# Patient Record
Sex: FEMALE | Race: BLACK OR AFRICAN AMERICAN | NOT HISPANIC OR LATINO | Employment: FULL TIME | URBAN - METROPOLITAN AREA
[De-identification: names, ages, dates, MRNs, and addresses within clinical notes are randomized per-mention and may not be internally consistent; named-entity substitution may affect disease eponyms.]

---

## 2023-03-14 ENCOUNTER — LAB VISIT (OUTPATIENT)
Dept: LAB | Facility: HOSPITAL | Age: 33
End: 2023-03-14
Attending: NURSE PRACTITIONER
Payer: MEDICAID

## 2023-03-14 DIAGNOSIS — Z01.818 PRE-OP EVALUATION: Primary | ICD-10-CM

## 2023-03-14 PROCEDURE — 93010 ELECTROCARDIOGRAM REPORT: CPT | Mod: ,,, | Performed by: INTERNAL MEDICINE

## 2023-03-14 PROCEDURE — 93010 EKG 12-LEAD: ICD-10-PCS | Mod: ,,, | Performed by: INTERNAL MEDICINE

## 2023-03-14 PROCEDURE — 93005 ELECTROCARDIOGRAM TRACING: CPT

## 2023-12-23 ENCOUNTER — HOSPITAL ENCOUNTER (EMERGENCY)
Facility: HOSPITAL | Age: 33
Discharge: HOME | End: 2023-12-23
Attending: INTERNAL MEDICINE
Payer: MEDICAID

## 2023-12-23 VITALS
OXYGEN SATURATION: 98 % | RESPIRATION RATE: 20 BRPM | WEIGHT: 208 LBS | HEART RATE: 87 BPM | BODY MASS INDEX: 35.51 KG/M2 | HEIGHT: 64 IN | SYSTOLIC BLOOD PRESSURE: 110 MMHG | TEMPERATURE: 98 F | DIASTOLIC BLOOD PRESSURE: 72 MMHG

## 2023-12-23 DIAGNOSIS — J01.90 ACUTE SINUSITIS, RECURRENCE NOT SPECIFIED, UNSPECIFIED LOCATION: Primary | ICD-10-CM

## 2023-12-23 DIAGNOSIS — R06.02 SOB (SHORTNESS OF BREATH): ICD-10-CM

## 2023-12-23 LAB
FLUAV AG UPPER RESP QL IA.RAPID: NOT DETECTED
FLUBV AG UPPER RESP QL IA.RAPID: NOT DETECTED
SARS-COV-2 RNA RESP QL NAA+PROBE: NOT DETECTED

## 2023-12-23 PROCEDURE — 0240U COVID/FLU A&B PCR: CPT | Performed by: INTERNAL MEDICINE

## 2023-12-23 PROCEDURE — 93010 EKG 12-LEAD: ICD-10-PCS | Mod: ,,, | Performed by: INTERNAL MEDICINE

## 2023-12-23 PROCEDURE — 99284 EMERGENCY DEPT VISIT MOD MDM: CPT

## 2023-12-23 PROCEDURE — 93010 ELECTROCARDIOGRAM REPORT: CPT | Mod: ,,, | Performed by: INTERNAL MEDICINE

## 2023-12-23 PROCEDURE — 93005 ELECTROCARDIOGRAM TRACING: CPT

## 2023-12-23 RX ORDER — AMOXICILLIN AND CLAVULANATE POTASSIUM 875; 125 MG/1; MG/1
1 TABLET, FILM COATED ORAL 2 TIMES DAILY
Qty: 20 TABLET | Refills: 0 | Status: SHIPPED | OUTPATIENT
Start: 2023-12-23 | End: 2024-01-02

## 2023-12-23 RX ORDER — AMOXICILLIN AND CLAVULANATE POTASSIUM 875; 125 MG/1; MG/1
1 TABLET, FILM COATED ORAL 2 TIMES DAILY
Qty: 20 TABLET | Refills: 0 | Status: SHIPPED | OUTPATIENT
Start: 2023-12-23 | End: 2023-12-23

## 2023-12-23 RX ORDER — AMOXICILLIN AND CLAVULANATE POTASSIUM 875; 125 MG/1; MG/1
1 TABLET, FILM COATED ORAL 2 TIMES DAILY
Qty: 20 TABLET | Refills: 0 | OUTPATIENT
Start: 2023-12-23 | End: 2023-12-23

## 2023-12-23 RX ORDER — FLUTICASONE PROPIONATE 50 MCG
1 SPRAY, SUSPENSION (ML) NASAL 2 TIMES DAILY PRN
Qty: 15 G | Refills: 0 | Status: SHIPPED | OUTPATIENT
Start: 2023-12-23 | End: 2023-12-23

## 2023-12-23 RX ORDER — CODEINE PHOSPHATE AND GUAIFENESIN 10; 100 MG/5ML; MG/5ML
SOLUTION ORAL
Qty: 120 ML | Refills: 0 | Status: SHIPPED | OUTPATIENT
Start: 2023-12-23 | End: 2023-12-23

## 2023-12-23 RX ORDER — PROMETHAZINE HYDROCHLORIDE AND DEXTROMETHORPHAN HYDROBROMIDE 6.25; 15 MG/5ML; MG/5ML
5 SYRUP ORAL EVERY 6 HOURS PRN
Qty: 100 ML | Refills: 0 | Status: SHIPPED | OUTPATIENT
Start: 2023-12-23 | End: 2023-12-28

## 2023-12-23 RX ORDER — FLUTICASONE PROPIONATE 50 MCG
1 SPRAY, SUSPENSION (ML) NASAL 2 TIMES DAILY PRN
Qty: 15 G | Refills: 0 | Status: SHIPPED | OUTPATIENT
Start: 2023-12-23

## 2023-12-23 NOTE — ED PROVIDER NOTES
Encounter Date: 12/23/2023       History     Chief Complaint   Patient presents with    Shortness of Breath     Reports of SOB, worse with exertion, starting 2 days ago. Patient also has runny nose/congestion. Denies hx of asthma or cardiac hx.      Patient states sinus congestion, runny nose, post-nasal drip, coughing, and body aches x 1 week. Denies any fever. Hx. Of DM, Hyperlipidemia.     The history is provided by the patient.   Sinusitis   This is a recurrent problem. The current episode started several days ago. The problem has been unchanged. The pain has been Intermittent since onset. Associated symptoms include congestion, hoarse voice, sinus pressure and cough. Pertinent negatives include no chills, no sore throat and no swollen glands.     Review of patient's allergies indicates:  No Known Allergies  No past medical history on file.  No past surgical history on file.  No family history on file.     Review of Systems   Constitutional: Negative.  Negative for chills and fever.   HENT:  Positive for congestion, hoarse voice, postnasal drip, rhinorrhea, sinus pressure and sinus pain. Negative for sore throat.    Eyes: Negative.    Respiratory:  Positive for cough.    Cardiovascular: Negative.    Gastrointestinal: Negative.    Endocrine: Negative.    Genitourinary: Negative.    Musculoskeletal: Negative.    Skin: Negative.    Allergic/Immunologic: Negative.    Neurological: Negative.    Hematological: Negative.    Psychiatric/Behavioral: Negative.     All other systems reviewed and are negative.      Physical Exam     Initial Vitals   BP Pulse Resp Temp SpO2   12/23/23 1204 12/23/23 1204 12/23/23 1204 12/23/23 1207 12/23/23 1204   110/72 87 20 98.4 °F (36.9 °C) 98 %      MAP       --                Physical Exam    Nursing note and vitals reviewed.  Constitutional: She appears well-developed and well-nourished. No distress.   HENT:   Head: Normocephalic and atraumatic.   Nose: Right sinus exhibits frontal  sinus tenderness. Left sinus exhibits frontal sinus tenderness.   Mouth/Throat: Uvula is midline, oropharynx is clear and moist and mucous membranes are normal.   Eyes: Conjunctivae and EOM are normal. Pupils are equal, round, and reactive to light.   Neck: Neck supple.   Normal range of motion.  Cardiovascular:  Normal rate, regular rhythm, normal heart sounds and intact distal pulses.           Pulmonary/Chest: Breath sounds normal. No respiratory distress. She has no wheezes.   Abdominal: Abdomen is soft. Bowel sounds are normal. She exhibits no distension. There is no abdominal tenderness.   Musculoskeletal:         General: No tenderness or edema. Normal range of motion.      Cervical back: Normal range of motion and neck supple.     Lymphadenopathy:     She has no cervical adenopathy.   Neurological: She is alert and oriented to person, place, and time. She has normal strength. GCS score is 15. GCS eye subscore is 4. GCS verbal subscore is 5. GCS motor subscore is 6.   Skin: Skin is warm and dry. No rash noted.   Psychiatric: She has a normal mood and affect. Thought content normal.         ED Course   Procedures  Labs Reviewed   COVID/FLU A&B PCR - Normal    Narrative:     The Xpert Xpress SARS-CoV-2/FLU/RSV plus is a rapid, multiplexed real-time PCR test intended for the simultaneous qualitative detection and differentiation of SARS-CoV-2, Influenza A, Influenza B, and respiratory syncytial virus (RSV) viral RNA in either nasopharyngeal swab or nasal swab specimens.           EKG Readings: (Independently Interpreted)   Initial Reading: No STEMI. Rhythm: Normal Sinus Rhythm. Heart Rate: 91. Ectopy: No Ectopy. Conduction: Normal. ST Segments: Normal ST Segments. T Waves: Normal. Axis: Normal. Clinical Impression: Normal Sinus Rhythm     ECG Results              EKG 12-lead (Preliminary result)  Result time 12/23/23 12:23:55      Wet Read by Erik Arteaga DO (12/23/23 12:23:55, [facility]  Orthopaedics - Emergency Dept, Emergency Medicine)    Independent ECG Interpretation:    NSR at rate of 91. Normal intervals. Normal QRS. Nonspecific ST or T wave abnormalities. Overall impression: Abnormal                                     Imaging Results    None          Medications - No data to display  Medical Decision Making  Patient states sinus congestion, runny nose, post-nasal drip, coughing, and body aches x 1 week. Denies any fever. Hx. Of DM, Hyperlipidemia.     The history is provided by the patient.   Sinusitis   This is a recurrent problem. The current episode started several days ago. The problem has been unchanged. The pain has been Intermittent since onset. Associated symptoms include congestion, hoarse voice, sinus pressure and cough. Pertinent negatives include no chills, no sore throat and no swollen glands.   Patient is awake, alert, afebrile, and nontoxic appearing in the ED.     Amount and/or Complexity of Data Reviewed  Labs: ordered. Decision-making details documented in ED Course.  Discussion of management or test interpretation with external provider(s): Differential diagnosis (including but not limited to):   Judging by the patient's chief complaint and pertinent history, the patient has the following possible differential diagnoses, including but not limited to the following.  Some of these are deemed to be lower likelihood and some more likely based on my physical exam and history combined with possible lab work and/or imaging studies.   Please see the pertinent studies, and refer to the HPI.  Some of these diagnoses will take further evaluation to fully rule out, perhaps as an outpatient and the patient was encouraged to follow up when discharged for more comprehensive evaluation.  Patient is negative for flu and Covid. Patient states that she is not having any SOB. States only congestion, post-nasal drip, and coughing. States that she was on antibiotics x 3 weeks ago for a  different complaint. Will treat for Sinusitis. Will prescribe Augmentin due to recent antibiotic use and co-morbidities. ED return precautions were given.       Risk  Prescription drug management.               ED Course as of 12/23/23 1333   Sat Dec 23, 2023   1317 COVID/FLU A&B PCR [AB]      ED Course User Index  [AB] Manju Kohli FNP                           Clinical Impression:  Final diagnoses:  [R06.02] SOB (shortness of breath)  [J01.90] Acute sinusitis, recurrence not specified, unspecified location (Primary)          ED Disposition Condition    Discharge Stable          ED Prescriptions       Medication Sig Dispense Start Date End Date Auth. Provider    amoxicillin-clavulanate 875-125mg (AUGMENTIN) 875-125 mg per tablet  (Status: Discontinued) Take 1 tablet by mouth 2 (two) times daily. for 10 days 20 tablet 12/23/2023 12/23/2023 Manju Kohli FNP    guaiFENesin-codeine 100-10 mg/5 ml (TUSSI-ORGANIDIN NR)  mg/5 mL syrup  (Status: Discontinued) May take 5 mL by mouth as needed for coughing every 6 hours. 120 mL 12/23/2023 12/23/2023 Manju Kohli FNP    fluticasone propionate (FLONASE) 50 mcg/actuation nasal spray  (Status: Discontinued) 1 spray (50 mcg total) by Each Nostril route 2 (two) times daily as needed for Rhinitis. 15 g 12/23/2023 12/23/2023 Manju Kohli FNP    amoxicillin-clavulanate 875-125mg (AUGMENTIN) 875-125 mg per tablet  (Status: Discontinued) Take 1 tablet by mouth 2 (two) times daily. for 10 days 20 tablet 12/23/2023 12/23/2023 Manju Kohli FNP    fluticasone propionate (FLONASE) 50 mcg/actuation nasal spray  (Status: Discontinued) 1 spray (50 mcg total) by Each Nostril route 2 (two) times daily as needed for Rhinitis. 15 g 12/23/2023 12/23/2023 Manju Kohli FNP    guaiFENesin-codeine 100-10 mg/5 ml (TUSSI-ORGANIDIN NR)  mg/5 mL syrup  (Status: Discontinued) May take 5 mL by mouth as needed for coughing every 6 hours. 120 mL 12/23/2023 12/23/2023 Bearb,  KARTIK Nicholas    amoxicillin-clavulanate 875-125mg (AUGMENTIN) 875-125 mg per tablet  (Status: Discontinued) Take 1 tablet by mouth 2 (two) times daily. for 10 days 20 tablet 12/23/2023 12/23/2023 Manju oKhli FNP    fluticasone propionate (FLONASE) 50 mcg/actuation nasal spray 1 spray (50 mcg total) by Each Nostril route 2 (two) times daily as needed for Rhinitis. 15 g 12/23/2023 -- Manju Kohli FNP    amoxicillin-clavulanate 875-125mg (AUGMENTIN) 875-125 mg per tablet Take 1 tablet by mouth 2 (two) times daily. for 10 days 20 tablet 12/23/2023 1/2/2024 Manju Kohli FNP    promethazine-dextromethorphan (PROMETHAZINE-DM) 6.25-15 mg/5 mL Syrp Take 5 mLs by mouth every 6 (six) hours as needed (Cough). 100 mL 12/23/2023 12/28/2023 Manju Kohli FNP          Follow-up Information       Follow up With Specialties Details Why Contact Info    Georgina Calix FNP Family Medicine In 3 days  [address] [facility] [id number] [phone]               Manju Kohli FNP  12/23/23 8332

## 2024-05-28 ENCOUNTER — HOSPITAL ENCOUNTER (EMERGENCY)
Facility: HOSPITAL | Age: 34
Discharge: HOME | End: 2024-05-28
Attending: INTERNAL MEDICINE
Payer: MEDICAID

## 2024-05-28 VITALS
RESPIRATION RATE: 15 BRPM | HEIGHT: 64 IN | SYSTOLIC BLOOD PRESSURE: 118 MMHG | WEIGHT: 193 LBS | BODY MASS INDEX: 32.95 KG/M2 | OXYGEN SATURATION: 100 % | TEMPERATURE: 98 F | DIASTOLIC BLOOD PRESSURE: 78 MMHG | HEART RATE: 90 BPM

## 2024-05-28 DIAGNOSIS — N30.00 ACUTE CYSTITIS WITHOUT HEMATURIA: Primary | ICD-10-CM

## 2024-05-28 DIAGNOSIS — R10.9 RIGHT FLANK PAIN: ICD-10-CM

## 2024-05-28 LAB
ALBUMIN SERPL-MCNC: 3.4 G/DL (ref 3.5–5)
ALBUMIN/GLOB SERPL: 0.9 RATIO (ref 1.1–2)
ALP SERPL-CCNC: 62 UNIT/L (ref 40–150)
ALT SERPL-CCNC: 11 UNIT/L (ref 0–55)
ANION GAP SERPL CALC-SCNC: 8 MEQ/L
AST SERPL-CCNC: 11 UNIT/L (ref 5–34)
B-HCG UR QL: NEGATIVE
BACTERIA #/AREA URNS AUTO: ABNORMAL /HPF
BASOPHILS # BLD AUTO: 0.03 X10(3)/MCL
BASOPHILS NFR BLD AUTO: 0.4 %
BILIRUB SERPL-MCNC: 0.4 MG/DL
BILIRUB UR QL STRIP.AUTO: NEGATIVE
BUN SERPL-MCNC: 8.8 MG/DL (ref 7–18.7)
CALCIUM SERPL-MCNC: 8.9 MG/DL (ref 8.4–10.2)
CHLORIDE SERPL-SCNC: 101 MMOL/L (ref 98–107)
CLARITY UR: ABNORMAL
CO2 SERPL-SCNC: 27 MMOL/L (ref 22–29)
COLOR UR AUTO: ABNORMAL
CREAT SERPL-MCNC: 0.8 MG/DL (ref 0.55–1.02)
CREAT/UREA NIT SERPL: 11
EOSINOPHIL # BLD AUTO: 0.16 X10(3)/MCL (ref 0–0.9)
EOSINOPHIL NFR BLD AUTO: 2.2 %
ERYTHROCYTE [DISTWIDTH] IN BLOOD BY AUTOMATED COUNT: 11.9 % (ref 11.5–17)
GFR SERPLBLD CREATININE-BSD FMLA CKD-EPI: >60 ML/MIN/1.73/M2
GLOBULIN SER-MCNC: 3.8 GM/DL (ref 2.4–3.5)
GLUCOSE SERPL-MCNC: 311 MG/DL (ref 74–100)
GLUCOSE UR QL STRIP: >=1000
HCT VFR BLD AUTO: 39.8 % (ref 37–47)
HGB BLD-MCNC: 13.8 G/DL (ref 12–16)
HGB UR QL STRIP: ABNORMAL
IMM GRANULOCYTES # BLD AUTO: 0.02 X10(3)/MCL (ref 0–0.04)
IMM GRANULOCYTES NFR BLD AUTO: 0.3 %
INR PPP: 1 (ref 2–3)
KETONES UR QL STRIP: NEGATIVE
LEUKOCYTE ESTERASE UR QL STRIP: ABNORMAL
LIPASE SERPL-CCNC: 64 U/L
LYMPHOCYTES # BLD AUTO: 1.51 X10(3)/MCL (ref 0.6–4.6)
LYMPHOCYTES NFR BLD AUTO: 20.9 %
MAGNESIUM SERPL-MCNC: 1.7 MG/DL (ref 1.6–2.6)
MCH RBC QN AUTO: 28.6 PG (ref 27–31)
MCHC RBC AUTO-ENTMCNC: 34.7 G/DL (ref 33–36)
MCV RBC AUTO: 82.4 FL (ref 80–94)
MONOCYTES # BLD AUTO: 0.38 X10(3)/MCL (ref 0.1–1.3)
MONOCYTES NFR BLD AUTO: 5.3 %
NEUTROPHILS # BLD AUTO: 5.12 X10(3)/MCL (ref 2.1–9.2)
NEUTROPHILS NFR BLD AUTO: 70.9 %
NITRITE UR QL STRIP: NEGATIVE
NRBC BLD AUTO-RTO: 0 %
PH UR STRIP: 6 [PH]
PLATELET # BLD AUTO: 215 X10(3)/MCL (ref 130–400)
PMV BLD AUTO: 8.8 FL (ref 7.4–10.4)
POCT GLUCOSE: 301 MG/DL (ref 70–110)
POTASSIUM SERPL-SCNC: 4.2 MMOL/L (ref 3.5–5.1)
PROT SERPL-MCNC: 7.2 GM/DL (ref 6.4–8.3)
PROT UR QL STRIP: ABNORMAL
PROTHROMBIN TIME: 13.1 SECONDS (ref 11.7–14.5)
RBC # BLD AUTO: 4.83 X10(6)/MCL (ref 4.2–5.4)
RBC #/AREA URNS AUTO: ABNORMAL /HPF
SODIUM SERPL-SCNC: 136 MMOL/L (ref 136–145)
SP GR UR STRIP.AUTO: 1.02 (ref 1–1.03)
SQUAMOUS #/AREA URNS AUTO: ABNORMAL /HPF
UROBILINOGEN UR STRIP-ACNC: 0.2
WBC # SPEC AUTO: 7.22 X10(3)/MCL (ref 4.5–11.5)
WBC #/AREA URNS AUTO: ABNORMAL /HPF

## 2024-05-28 PROCEDURE — 83690 ASSAY OF LIPASE: CPT | Performed by: INTERNAL MEDICINE

## 2024-05-28 PROCEDURE — 81003 URINALYSIS AUTO W/O SCOPE: CPT | Performed by: INTERNAL MEDICINE

## 2024-05-28 PROCEDURE — 63600175 PHARM REV CODE 636 W HCPCS: Performed by: INTERNAL MEDICINE

## 2024-05-28 PROCEDURE — 80053 COMPREHEN METABOLIC PANEL: CPT | Performed by: INTERNAL MEDICINE

## 2024-05-28 PROCEDURE — 96375 TX/PRO/DX INJ NEW DRUG ADDON: CPT

## 2024-05-28 PROCEDURE — 87086 URINE CULTURE/COLONY COUNT: CPT | Performed by: INTERNAL MEDICINE

## 2024-05-28 PROCEDURE — 85610 PROTHROMBIN TIME: CPT | Performed by: INTERNAL MEDICINE

## 2024-05-28 PROCEDURE — 83735 ASSAY OF MAGNESIUM: CPT | Performed by: INTERNAL MEDICINE

## 2024-05-28 PROCEDURE — 99285 EMERGENCY DEPT VISIT HI MDM: CPT | Mod: 25

## 2024-05-28 PROCEDURE — 85025 COMPLETE CBC W/AUTO DIFF WBC: CPT | Performed by: INTERNAL MEDICINE

## 2024-05-28 PROCEDURE — 25500020 PHARM REV CODE 255: Performed by: INTERNAL MEDICINE

## 2024-05-28 PROCEDURE — 96374 THER/PROPH/DIAG INJ IV PUSH: CPT | Mod: 59

## 2024-05-28 PROCEDURE — 96361 HYDRATE IV INFUSION ADD-ON: CPT

## 2024-05-28 PROCEDURE — 81025 URINE PREGNANCY TEST: CPT | Performed by: INTERNAL MEDICINE

## 2024-05-28 RX ORDER — CIPROFLOXACIN 500 MG/1
500 TABLET ORAL 2 TIMES DAILY
Qty: 20 TABLET | Refills: 0 | Status: SHIPPED | OUTPATIENT
Start: 2024-05-28 | End: 2024-05-31 | Stop reason: SDUPTHER

## 2024-05-28 RX ORDER — ONDANSETRON 4 MG/1
4 TABLET, ORALLY DISINTEGRATING ORAL EVERY 6 HOURS PRN
Qty: 15 TABLET | Refills: 0 | Status: SHIPPED | OUTPATIENT
Start: 2024-05-28

## 2024-05-28 RX ORDER — KETOROLAC TROMETHAMINE 10 MG/1
10 TABLET, FILM COATED ORAL EVERY 6 HOURS PRN
Qty: 15 TABLET | Refills: 0 | Status: SHIPPED | OUTPATIENT
Start: 2024-05-28

## 2024-05-28 RX ORDER — KETOROLAC TROMETHAMINE 30 MG/ML
30 INJECTION, SOLUTION INTRAMUSCULAR; INTRAVENOUS
Status: COMPLETED | OUTPATIENT
Start: 2024-05-28 | End: 2024-05-28

## 2024-05-28 RX ORDER — HYDROCODONE BITARTRATE AND ACETAMINOPHEN 7.5; 325 MG/1; MG/1
1 TABLET ORAL EVERY 6 HOURS PRN
Qty: 12 TABLET | Refills: 0 | Status: SHIPPED | OUTPATIENT
Start: 2024-05-28

## 2024-05-28 RX ORDER — ONDANSETRON HYDROCHLORIDE 2 MG/ML
4 INJECTION, SOLUTION INTRAVENOUS ONCE
Status: COMPLETED | OUTPATIENT
Start: 2024-05-28 | End: 2024-05-28

## 2024-05-28 RX ADMIN — KETOROLAC TROMETHAMINE 30 MG: 30 INJECTION, SOLUTION INTRAMUSCULAR at 03:05

## 2024-05-28 RX ADMIN — SODIUM CHLORIDE, POTASSIUM CHLORIDE, SODIUM LACTATE AND CALCIUM CHLORIDE 1000 ML: 600; 310; 30; 20 INJECTION, SOLUTION INTRAVENOUS at 11:05

## 2024-05-28 RX ADMIN — IOHEXOL 100 ML: 350 INJECTION, SOLUTION INTRAVENOUS at 12:05

## 2024-05-28 RX ADMIN — ONDANSETRON 4 MG: 2 INJECTION INTRAMUSCULAR; INTRAVENOUS at 11:05

## 2024-05-28 NOTE — ED NOTES
, pt drank OJ pta & did not take her glyburide or metformin today, she did take Ozempic yesterday.

## 2024-05-28 NOTE — ED PROVIDER NOTES
Source of History:  Patient, no limitations    Chief complaint:  Abdominal Pain (Pt c/o right sided abd pain onset today. States has also noticed blood in urine.)      HPI:  Tacos Basilio is a 33 y.o. female presenting with Abdominal Pain (Pt c/o right sided abd pain onset today. States has also noticed blood in urine.)       Right abd pain after breakfast this morning, ate eggs and sausage, no vomiting, no diarrhea, is on Ozympic and last dose yesterday     Patient presents for evaluation of abdominal pain. Onset of symptoms was this moning with unchanged course since that time. The pain is located RUQ, RLQ. The pain is rated as severe. The pain is made worse by pressure and is relieved by nothing. The pertinent past history includes obesity on ozympic.         Review of Systems   Constitutional symptoms:  Negative except as documented in HPI.   Skin symptoms:  Negative except as documented in HPI.   HEENT symptoms:  Negative except as documented in HPI.   Respiratory symptoms:  Negative except as documented in HPI.   Cardiovascular symptoms:  Negative except as documented in HPI.   Gastrointestinal symptoms:  Negative except as documented in HPI.    Genitourinary symptoms:  Negative except as documented in HPI.   Musculoskeletal symptoms:  Negative except as documented in HPI.   Neurologic symptoms:  Negative except as documented in HPI.   Psychiatric symptoms:  Negative except as documented in HPI.   Allergy/immunologic symptoms:  Negative except as documented in HPI.             Additional review of systems information: All other systems reviewed and otherwise negative.      Review of patient's allergies indicates:  No Known Allergies    PMH:  As per HPI and below:    History reviewed. No pertinent past medical history.    History reviewed. No pertinent family history.    History reviewed. No pertinent surgical history.         There is no problem list on file for this patient.       Physical Exam:   "  /78   Pulse 90   Temp 97.9 °F (36.6 °C) (Temporal)   Resp 15   Ht 5' 4" (1.626 m)   Wt 87.5 kg (193 lb)   LMP 05/18/2024 Comment: hx of tubal ligation  SpO2 100%   Breastfeeding No   BMI 33.13 kg/m²     Nursing note and vital signs reviewed.    General:  Alert, no acute distress. Phone in hand texting   Skin: Normal for Ethnic Origin, No cyanosis  HEENT: Normocephalic and atraumatic, Vision unchanged, Pupils symmetric, No icterus , Nasal mucosa is pink and moist  Cardiovascular:  Regular rate and rhythm, No edema  Chest Wall: No deformity, equal chest rise  Respiratory:  Lungs are clear to auscultation, respirations are non-labored.    Musculoskeletal:  No deformity, Normal perfusion to all extremities  Gastrointestinal:  Soft, Non distended, moderate right RUQ tenderness to right CVA tenderness  Neurological:  Alert and oriented, normal motor observed, normal speech observed.    Psychiatric:  Cooperative, appropriate mood & affect.        Labs that have been ordered have been independently reviewed and interpreted by myself.     Old Chart Reviewed.      Initial Impression/ Differential Dx:  GERD, intestinal spasm, gastroenteritis, gastritis, ulcer, cholecystitis, cholelithiasis, gallstones, pancreatitis, ileus, small bowel obstruction, appendicitis, diverticulitis, colitis, constipation, intestinal gas pain.      MDM:      Reviewed Nurses Note.    Reviewed Pertinent old records.    Orders Placed This Encounter    Urine culture    CT Abdomen Pelvis With IV Contrast NO Oral Contrast    Urinalysis, Reflex to Urine Culture    Pregnancy, urine rapid    Urinalysis, Microscopic    CBC Auto Differential    Comprehensive Metabolic Panel    Protime-INR    Lipase    Magnesium    CBC with Differential    Insert peripheral IV    POCT glucose    POCT glucose    lactated ringers bolus 1,000 mL    ondansetron injection 4 mg    iohexoL (OMNIPAQUE 350) injection 100 mL    ketorolac injection 30 mg    ondansetron " (ZOFRAN-ODT) 4 MG TbDL    ketorolac (TORADOL) 10 mg tablet    HYDROcodone-acetaminophen (NORCO) 7.5-325 mg per tablet    ciprofloxacin HCl (CIPRO) 500 MG tablet                    Labs Reviewed   URINALYSIS, REFLEX TO URINE CULTURE - Abnormal; Notable for the following components:       Result Value    Appearance, UA Cloudy (*)     Protein, UA Trace (*)     Glucose, UA >=1000 (*)     Blood, UA Trace (*)     Leukocyte Esterase, UA Trace (*)     All other components within normal limits   URINALYSIS, MICROSCOPIC - Abnormal; Notable for the following components:    Bacteria, UA Moderate (*)     WBC, UA 21-50 (*)     All other components within normal limits   COMPREHENSIVE METABOLIC PANEL - Abnormal; Notable for the following components:    Glucose 311 (*)     Albumin 3.4 (*)     Globulin 3.8 (*)     Albumin/Globulin Ratio 0.9 (*)     All other components within normal limits   PROTIME-INR - Abnormal; Notable for the following components:    INR 1.0 (*)     All other components within normal limits   LIPASE - Abnormal; Notable for the following components:    Lipase Level 64 (*)     All other components within normal limits   POCT GLUCOSE - Abnormal; Notable for the following components:    POCT Glucose 301 (*)     All other components within normal limits   PREGNANCY TEST, URINE RAPID - Normal   MAGNESIUM - Normal   CULTURE, URINE   CBC W/ AUTO DIFFERENTIAL    Narrative:     The following orders were created for panel order CBC Auto Differential.  Procedure                               Abnormality         Status                     ---------                               -----------         ------                     CBC with Differential[[id number]]                           Final result                 Please view results for these tests on the individual orders.   CBC WITH DIFFERENTIAL   POCT GLUCOSE MONITORING CONTINUOUS          CT Abdomen Pelvis With IV Contrast NO Oral Contrast   Final Result      1. Mild  right ureterectasis and urothelial enhancement suggesting inflammation or infection.   There are multiple 2-4 mm calcifications in the pelvis.  Unable to follow the course of the distal ureters to exclude distal ureteral stone.         Electronically signed by: Joanne Da Silva   Date:    05/28/2024   Time:    15:11           Admission on 05/28/2024, Discharged on 05/28/2024   Component Date Value Ref Range Status    Color, UA 05/28/2024 Straw  Yellow, Light-Yellow, Dark Yellow, [place], Straw Final    Appearance, UA 05/28/2024 Cloudy (A)  Clear Final    Specific Gravity, UA 05/28/2024 1.020  1.005 - 1.030 Final    pH, UA 05/28/2024 6.0  5.0 - 8.5 Final    Protein, UA 05/28/2024 Trace (A)  Negative Final    Glucose, UA 05/28/2024 >=1000 (A)  Negative, Normal Final    Ketones, UA 05/28/2024 Negative  Negative Final    Blood, UA 05/28/2024 Trace (A)  Negative Final    Bilirubin, UA 05/28/2024 Negative  Negative Final    Urobilinogen, UA 05/28/2024 0.2  0.2, 1.0, Normal Final    Nitrites, UA 05/28/2024 Negative  Negative Final    Leukocyte Esterase, UA 05/28/2024 Trace (A)  Negative Final    hCG Qualitative, Urine 05/28/2024 Negative  Negative Final    Bacteria, UA 05/28/2024 Moderate (A)  None Seen, Rare, Occasional /HPF Final    RBC, UA 05/28/2024 0-2  None Seen, 0-2, 3-5, 0-5 /HPF Final    WBC, UA 05/28/2024 21-50 (A)  None Seen, 0-2, 3-5, 0-5 /HPF Final    Squamous Epithelial Cells, UA 05/28/2024 Rare  None Seen, Rare, Occasional, Occ /HPF Final    Sodium 05/28/2024 136  136 - 145 mmol/L Final    Potassium 05/28/2024 4.2  3.5 - 5.1 mmol/L Final    Chloride 05/28/2024 101  98 - 107 mmol/L Final    CO2 05/28/2024 27  22 - 29 mmol/L Final    Glucose 05/28/2024 311 (H)  74 - 100 mg/dL Final    Blood Urea Nitrogen 05/28/2024 8.8  7.0 - 18.7 mg/dL Final    Creatinine 05/28/2024 0.80  0.55 - 1.02 mg/dL Final    Calcium 05/28/2024 8.9  8.4 - 10.2 mg/dL Final    Protein Total 05/28/2024 7.2  6.4 - 8.3 gm/dL Final    Albumin  05/28/2024 3.4 (L)  3.5 - 5.0 g/dL Final    Globulin 05/28/2024 3.8 (H)  2.4 - 3.5 gm/dL Final    Albumin/Globulin Ratio 05/28/2024 0.9 (L)  1.1 - 2.0 ratio Final    Bilirubin Total 05/28/2024 0.4  <=1.5 mg/dL Final    ALP 05/28/2024 62  40 - 150 unit/L Final    ALT 05/28/2024 11  0 - 55 unit/L Final    AST 05/28/2024 11  5 - 34 unit/L Final    eGFR 05/28/2024 >60  mL/min/1.73/m2 Final    Anion Gap 05/28/2024 8.0  mEq/L Final    BUN/Creatinine Ratio 05/28/2024 11   Final    PT 05/28/2024 13.1  11.7 - 14.5 seconds Final    INR 05/28/2024 1.0 (L)  2.0 - 3.0 Final    Lipase Level 05/28/2024 64 (H)  <=60 U/L Final    Magnesium Level 05/28/2024 1.70  1.60 - 2.60 mg/dL Final    WBC 05/28/2024 7.22  4.50 - 11.50 x10(3)/mcL Final    RBC 05/28/2024 4.83  4.20 - 5.40 x10(6)/mcL Final    Hgb 05/28/2024 13.8  12.0 - 16.0 g/dL Final    Hct 05/28/2024 39.8  37.0 - 47.0 % Final    MCV 05/28/2024 82.4  80.0 - 94.0 fL Final    MCH 05/28/2024 28.6  27.0 - 31.0 pg Final    MCHC 05/28/2024 34.7  33.0 - 36.0 g/dL Final    RDW 05/28/2024 11.9  11.5 - 17.0 % Final    Platelet 05/28/2024 215  130 - 400 x10(3)/mcL Final    MPV 05/28/2024 8.8  7.4 - 10.4 fL Final    Neut % 05/28/2024 70.9  % Final    Lymph % 05/28/2024 20.9  % Final    Mono % 05/28/2024 5.3  % Final    Eos % 05/28/2024 2.2  % Final    Basophil % 05/28/2024 0.4  % Final    Lymph # 05/28/2024 1.51  0.6 - 4.6 x10(3)/mcL Final    Neut # 05/28/2024 5.12  2.1 - 9.2 x10(3)/mcL Final    Mono # 05/28/2024 0.38  0.1 - 1.3 x10(3)/mcL Final    Eos # 05/28/2024 0.16  0 - 0.9 x10(3)/mcL Final    Baso # 05/28/2024 0.03  <=0.2 x10(3)/mcL Final    IG# 05/28/2024 0.02  0 - 0.04 x10(3)/mcL Final    IG% 05/28/2024 0.3  % Final    NRBC% 05/28/2024 0.0  % Final    POCT Glucose 05/28/2024 301 (H)  70 - 110 mg/dL Final       Imaging Results              CT Abdomen Pelvis With IV Contrast NO Oral Contrast (Final result)  Result time 05/28/24 15:11:05      Final result by Joanne Da Silva MD  (05/28/24 15:11:05)                   Impression:      1. Mild right ureterectasis and urothelial enhancement suggesting inflammation or infection.   There are multiple 2-4 mm calcifications in the pelvis.  Unable to follow the course of the distal ureters to exclude distal ureteral stone.      Electronically signed by: Joanne Da Silva  Date:    05/28/2024  Time:    15:11               Narrative:    EXAMINATION:  CT ABDOMEN PELVIS WITH IV CONTRAST    CLINICAL HISTORY:  Abdominal pain, acute, nonlocalized;    TECHNIQUE:  Helically acquired images with axial, sagittal and coronal reformations were obtained from the lung bases to the pubic symphysis after the IV administration of contrast.    Automated tube current modulation, weight-based exposure dosing, and/or iterative reconstruction technique utilized to reach lowest reasonably achievable exposure rate.    DLP: 434 mGy*cm    COMPARISON:  No relevant prior available for comparison at the time of dictation.    FINDINGS:  HEART: Normal in size. No pericardial effusion.    LUNG BASES: Well aerated.    LIVER: Normal attenuation. No appreciable focal hepatic lesion.    BILIARY: No calcified gallstones.    PANCREAS: No inflammatory change.    SPLEEN: Normal in size    ADRENALS: No mass.    KIDNEYS/URETERS: The kidneys enhance symmetrically.  There is no darlene hydronephrosis.  There is mild right ureterectasis and urothelial enhancement.   There are multiple 2-4 mm calcifications in the pelvis compatible with phleboliths.  However it is difficult to follow the distal right ureter; as such unable to exclude tiny distal ureteral stone.  No perinephric stranding.    GI TRACT/MESENTERY:  No evidence of bowel obstruction or inflammation. The appendix is normal.    PERITONEUM: No free fluid.No free air.    LYMPH NODES: No enlarged lymph nodes by size criteria.    VASCULATURE: No significant atherosclerosis or aneurysm.    BLADDER: Normal appearance given degree of  distention.    REPRODUCTIVE ORGANS: Bartholin's cyst at the left labia.    SOFT TISSUES: Unremarkable.    BONES: No acute osseous abnormality.                                                     ED Course as of 05/28/24 1601   Tue May 28, 2024   1106 Leukocyte Esterase, UA(!): Trace [MP]   1106 Glucose, UA(!): >=1000 [MP]   1217 Glucose(!): 311 [MP]   1217 CO2: 27 [MP]   1217 Lipase(!): 64 [MP]   1519 WBC, UA(!): 21-50 [MP]   1519 RBC, UA: 0-2 [MP]      ED Course User Index  [MP] Erik Arteaga DO                        Diagnostic Impression:    1. Acute cystitis without hematuria    2. Right flank pain         ED Disposition Condition    Discharge Stable             Follow-up Information       [facility] - Emergency Dept.    Specialty: Emergency Medicine  Why: If symptoms worsen  Contact information:  [address]  [facility] [id number] [phone]             Schedule an appointment as soon as possible for a visit  with Ari Hendricks MD.    Specialty: Pediatric Urology  Why: If symptoms worsen  Contact information:  [address]  [address]  [facility]  [facility] [address]  [phone]                              ED Prescriptions       Medication Sig Dispense Start Date End Date Auth. Provider    ondansetron (ZOFRAN-ODT) 4 MG TbDL Take 1 tablet (4 mg total) by mouth every 6 (six) hours as needed (Nausea). 15 tablet 5/28/2024 -- Erik Arteaga DO    ketorolac (TORADOL) 10 mg tablet Take 1 tablet (10 mg total) by mouth every 6 (six) hours as needed for Pain. 15 tablet 5/28/2024 -- Erik Arteaga DO    HYDROcodone-acetaminophen (NORCO) 7.5-325 mg per tablet Take 1 tablet by mouth every 6 (six) hours as needed for Pain. 12 tablet 5/28/2024 -- Erik Arteaga DO    ciprofloxacin HCl (CIPRO) 500 MG tablet Take 1 tablet (500 mg total) by mouth 2 (two) times daily. for 10 days 20 tablet 5/28/2024 6/7/2024 Erik Arteaga  B, DO          Follow-up Information       Follow up With Specialties Details Why Contact Info    [facility] Orth[facility] - Emergency Dept Emergency Medicine  If symptoms worsen [address]  [facility] [id number]  [phone]    Ari Hendricks MD Pediatric Urology Schedule an appointment as soon as possible for a visit  If symptoms worsen [address]  [facility] [address]  [phone]              Erik Arteaga, DO  05/28/24 1603

## 2024-05-30 LAB — BACTERIA UR CULT: ABNORMAL

## 2024-05-31 DIAGNOSIS — N30.00 ACUTE CYSTITIS WITHOUT HEMATURIA: Primary | ICD-10-CM

## 2024-05-31 RX ORDER — SULFAMETHOXAZOLE AND TRIMETHOPRIM 800; 160 MG/1; MG/1
1 TABLET ORAL 2 TIMES DAILY
Qty: 6 TABLET | Refills: 0 | Status: SHIPPED | OUTPATIENT
Start: 2024-05-31 | End: 2024-06-03

## 2024-09-18 ENCOUNTER — HOSPITAL ENCOUNTER (EMERGENCY)
Facility: HOSPITAL | Age: 34
Discharge: HOME | End: 2024-09-18
Attending: STUDENT IN AN ORGANIZED HEALTH CARE EDUCATION/TRAINING PROGRAM
Payer: MEDICAID

## 2024-09-18 VITALS
HEART RATE: 84 BPM | OXYGEN SATURATION: 99 % | HEIGHT: 64 IN | SYSTOLIC BLOOD PRESSURE: 132 MMHG | DIASTOLIC BLOOD PRESSURE: 74 MMHG | RESPIRATION RATE: 18 BRPM | WEIGHT: 185 LBS | BODY MASS INDEX: 31.58 KG/M2 | TEMPERATURE: 99 F

## 2024-09-18 DIAGNOSIS — R10.9 LEFT FLANK PAIN: Primary | ICD-10-CM

## 2024-09-18 DIAGNOSIS — E11.65 UNCONTROLLED TYPE 2 DIABETES MELLITUS WITH HYPERGLYCEMIA: ICD-10-CM

## 2024-09-18 LAB
ALBUMIN SERPL-MCNC: 3.6 G/DL (ref 3.5–5)
ALBUMIN/GLOB SERPL: 1 RATIO (ref 1.1–2)
ALP SERPL-CCNC: 60 UNIT/L (ref 40–150)
ALT SERPL-CCNC: 11 UNIT/L (ref 0–55)
ANION GAP SERPL CALC-SCNC: 9 MEQ/L
AST SERPL-CCNC: 12 UNIT/L (ref 5–34)
B-HCG UR QL: NEGATIVE
BACTERIA #/AREA URNS AUTO: ABNORMAL /HPF
BASOPHILS # BLD AUTO: 0.04 X10(3)/MCL
BASOPHILS NFR BLD AUTO: 0.7 %
BILIRUB SERPL-MCNC: 0.8 MG/DL
BILIRUB UR QL STRIP.AUTO: NEGATIVE
BUN SERPL-MCNC: 7.1 MG/DL (ref 7–18.7)
CALCIUM SERPL-MCNC: 9.3 MG/DL (ref 8.4–10.2)
CHLORIDE SERPL-SCNC: 102 MMOL/L (ref 98–107)
CLARITY UR: ABNORMAL
CO2 SERPL-SCNC: 24 MMOL/L (ref 22–29)
COLOR UR AUTO: YELLOW
CREAT SERPL-MCNC: 0.93 MG/DL (ref 0.55–1.02)
CREAT/UREA NIT SERPL: 8
EOSINOPHIL # BLD AUTO: 0.21 X10(3)/MCL (ref 0–0.9)
EOSINOPHIL NFR BLD AUTO: 3.4 %
ERYTHROCYTE [DISTWIDTH] IN BLOOD BY AUTOMATED COUNT: 11.9 % (ref 11.5–17)
GFR SERPLBLD CREATININE-BSD FMLA CKD-EPI: >60 ML/MIN/1.73/M2
GLOBULIN SER-MCNC: 3.7 GM/DL (ref 2.4–3.5)
GLUCOSE SERPL-MCNC: 325 MG/DL (ref 74–100)
GLUCOSE UR QL STRIP: >=1000
HCT VFR BLD AUTO: 39.6 % (ref 37–47)
HGB BLD-MCNC: 13.7 G/DL (ref 12–16)
HGB UR QL STRIP: ABNORMAL
IMM GRANULOCYTES # BLD AUTO: 0.01 X10(3)/MCL (ref 0–0.04)
IMM GRANULOCYTES NFR BLD AUTO: 0.2 %
KETONES UR QL STRIP: NEGATIVE
LEUKOCYTE ESTERASE UR QL STRIP: NEGATIVE
LYMPHOCYTES # BLD AUTO: 1.97 X10(3)/MCL (ref 0.6–4.6)
LYMPHOCYTES NFR BLD AUTO: 32.1 %
MCH RBC QN AUTO: 27.9 PG (ref 27–31)
MCHC RBC AUTO-ENTMCNC: 34.6 G/DL (ref 33–36)
MCV RBC AUTO: 80.7 FL (ref 80–94)
MONOCYTES # BLD AUTO: 0.38 X10(3)/MCL (ref 0.1–1.3)
MONOCYTES NFR BLD AUTO: 6.2 %
NEUTROPHILS # BLD AUTO: 3.52 X10(3)/MCL (ref 2.1–9.2)
NEUTROPHILS NFR BLD AUTO: 57.4 %
NITRITE UR QL STRIP: NEGATIVE
NRBC BLD AUTO-RTO: 0 %
PH UR STRIP: 6 [PH]
PLATELET # BLD AUTO: 246 X10(3)/MCL (ref 130–400)
PMV BLD AUTO: 8.9 FL (ref 7.4–10.4)
POTASSIUM SERPL-SCNC: 3.7 MMOL/L (ref 3.5–5.1)
PROT SERPL-MCNC: 7.3 GM/DL (ref 6.4–8.3)
PROT UR QL STRIP: NEGATIVE
RBC # BLD AUTO: 4.91 X10(6)/MCL (ref 4.2–5.4)
RBC #/AREA URNS AUTO: ABNORMAL /HPF
SODIUM SERPL-SCNC: 135 MMOL/L (ref 136–145)
SP GR UR STRIP.AUTO: 1.02 (ref 1–1.03)
SPERM URNS QL MICRO: ABNORMAL /HPF
SQUAMOUS #/AREA URNS AUTO: ABNORMAL /HPF
UROBILINOGEN UR STRIP-ACNC: 0.2
WBC # BLD AUTO: 6.13 X10(3)/MCL (ref 4.5–11.5)
WBC #/AREA URNS AUTO: ABNORMAL /HPF

## 2024-09-18 PROCEDURE — 81025 URINE PREGNANCY TEST: CPT | Performed by: STUDENT IN AN ORGANIZED HEALTH CARE EDUCATION/TRAINING PROGRAM

## 2024-09-18 PROCEDURE — 99284 EMERGENCY DEPT VISIT MOD MDM: CPT | Mod: 25

## 2024-09-18 PROCEDURE — 85025 COMPLETE CBC W/AUTO DIFF WBC: CPT | Performed by: STUDENT IN AN ORGANIZED HEALTH CARE EDUCATION/TRAINING PROGRAM

## 2024-09-18 PROCEDURE — 81001 URINALYSIS AUTO W/SCOPE: CPT | Performed by: STUDENT IN AN ORGANIZED HEALTH CARE EDUCATION/TRAINING PROGRAM

## 2024-09-18 PROCEDURE — 81003 URINALYSIS AUTO W/O SCOPE: CPT | Performed by: STUDENT IN AN ORGANIZED HEALTH CARE EDUCATION/TRAINING PROGRAM

## 2024-09-18 PROCEDURE — 80053 COMPREHEN METABOLIC PANEL: CPT | Performed by: STUDENT IN AN ORGANIZED HEALTH CARE EDUCATION/TRAINING PROGRAM

## 2024-09-18 RX ORDER — SEMAGLUTIDE 2.68 MG/ML
2 INJECTION, SOLUTION SUBCUTANEOUS WEEKLY
COMMUNITY
Start: 2024-09-12

## 2024-09-18 RX ORDER — GLIPIZIDE 5 MG/1
1 TABLET ORAL EVERY MORNING
COMMUNITY

## 2024-09-18 NOTE — Clinical Note
"Tacos Blue" Praful was seen and treated in our emergency department on 9/18/2024.  She may return to work on 09/20/2024.       If you have any questions or concerns, please don't hesitate to call.      ESTRELLA HYATT    "

## 2024-09-18 NOTE — ED PROVIDER NOTES
Encounter Date: 9/18/2024       History     Chief Complaint   Patient presents with    Flank Pain     Left flank pain and pressure when urinate x3 days; hx of kidney stones       HPI    33-year-old female with a past medical history of type 2 diabetes and hyperlipidemia presents emergency department for left-sided flank pain.  Patient states it has been going on for 3 days.  States it feels very similar to last time she had a kidney stone but it was on the right side that time.  No nausea or vomiting.  States that she has more pressure when she needs to urinate but does not have any burning.  No fever.    Review of patient's allergies indicates:  No Known Allergies  Past Medical History:   Diagnosis Date    Diabetes mellitus     HLD (hyperlipidemia)      History reviewed. No pertinent surgical history.  No family history on file.  Social History     Tobacco Use    Smoking status: Never    Smokeless tobacco: Never   Substance Use Topics    Drug use: Never     Review of Systems   Constitutional:  Negative for fever.   Respiratory:  Negative for cough.    Cardiovascular:  Negative for chest pain.   Gastrointestinal:  Negative for abdominal pain, constipation, diarrhea, nausea and vomiting.   Genitourinary:  Positive for flank pain and frequency.   Neurological:  Negative for headaches.   All other systems reviewed and are negative.      Physical Exam     Initial Vitals [09/18/24 1221]   BP Pulse Resp Temp SpO2   (!) 140/76 87 18 98.6 °F (37 °C) 99 %      MAP       --         Physical Exam    Nursing note and vitals reviewed.  Constitutional: She appears well-developed and well-nourished. No distress.   Cardiovascular:  Normal rate and regular rhythm.           Pulmonary/Chest: Breath sounds normal. No respiratory distress. She has no wheezes. She has no rhonchi. She has no rales.   Abdominal: Abdomen is soft. There is no abdominal tenderness.   No right CVA tenderness.  There is left CVA tenderness. There is no rebound  and no guarding.   Musculoskeletal:         General: No tenderness. Normal range of motion.     Neurological: She is alert and oriented to person, place, and time. She has normal strength.   Skin: Skin is warm. Capillary refill takes less than 2 seconds.         ED Course   Procedures  Labs Reviewed   COMPREHENSIVE METABOLIC PANEL - Abnormal       Result Value    Sodium 135 (*)     Potassium 3.7      Chloride 102      CO2 24      Glucose 325 (*)     Blood Urea Nitrogen 7.1      Creatinine 0.93      Calcium 9.3      Protein Total 7.3      Albumin 3.6      Globulin 3.7 (*)     Albumin/Globulin Ratio 1.0 (*)     Bilirubin Total 0.8      ALP 60      ALT 11      AST 12      eGFR >60      Anion Gap 9.0      BUN/Creatinine Ratio 8     URINALYSIS, REFLEX TO URINE CULTURE - Abnormal    Color, UA Yellow      Appearance, UA Hazy (*)     Specific Gravity, UA 1.025      pH, UA 6.0      Protein, UA Negative      Glucose, UA >=1000 (*)     Ketones, UA Negative      Blood, UA Moderate (*)     Bilirubin, UA Negative      Urobilinogen, UA 0.2      Nitrites, UA Negative      Leukocyte Esterase, UA Negative     URINALYSIS, MICROSCOPIC - Abnormal    Bacteria, UA Few (*)     Sperm, UA Few (*)     RBC, UA 6-10 (*)     WBC, UA 0-2      Squamous Epithelial Cells, UA Moderate (*)    PREGNANCY TEST, URINE RAPID - Normal    hCG Qualitative, Urine Negative     CBC W/ AUTO DIFFERENTIAL    Narrative:     The following orders were created for panel order CBC auto differential.  Procedure                               Abnormality         Status                     ---------                               -----------         ------                     CBC with Differential[[id number]]                           Final result                 Please view results for these tests on the individual orders.   CBC WITH DIFFERENTIAL    WBC 6.13      RBC 4.91      Hgb 13.7      Hct 39.6      MCV 80.7      MCH 27.9      MCHC 34.6      RDW 11.9      Platelet 246       MPV 8.9      Neut % 57.4      Lymph % 32.1      Mono % 6.2      Eos % 3.4      Basophil % 0.7      Lymph # 1.97      Neut # 3.52      Mono # 0.38      Eos # 0.21      Baso # 0.04      IG# 0.01      IG% 0.2      NRBC% 0.0            Imaging Results              CT Abdomen Pelvis  Without Contrast (Final result)  Result time 09/18/24 14:00:13      Final result by Will Gonzalez MD (09/18/24 14:00:13)                   Impression:      No acute process identified in the abdomen or pelvis.    Small site of ground-glass and tiny nodules in the right lower lobe likely infectious or inflammatory.      Electronically signed by: Will Gonzalez  Date:    09/18/2024  Time:    14:00               Narrative:    EXAMINATION:  CT ABDOMEN PELVIS WITHOUT CONTRAST    CLINICAL HISTORY:  Flank pain, kidney stone suspected;    TECHNIQUE:  CT imaging of the abdomen and pelvis without intravenous contrast. Axial, coronal and sagittal reformatted images reviewed. Dose length product is 464 mGycm. Automatic exposure control, adjustment of mA/kV or iterative reconstruction technique used to limit radiation dose.    COMPARISON:  CT 05/28/2024    FINDINGS:  Assessment of the visceral organs and vasculature is limited by the lack of IV contrast.    Liver/biliary: No concerning hepatic findings. No radiodense gallstone or biliary dilatation appreciated.    Pancreas: Normal.    Spleen: Normal.    Adrenals: Normal.    Genitourinary: No hydronephrosis or defined urinary stone. Bladder within normal limits.    Stomach/bowel: No bowel obstruction. Normal appendix. No discernible bowel inflammation.    Lymph nodes and peritoneum: No pathologically enlarged lymph node identified with noncontrast technique. No ascites or free air.    Vasculature: Normal abdominal aortic caliber.    Abdominal wall: Minimal postsurgical changes in the lower anterior abdominal wall.    Lung bases: 5 cm area of ground-glass and multiple tiny nodules in the right lower  lobe.  No pleural effusion.    Bones: No acute osseous findings.                                       Medications - No data to display  Medical Decision Making  Initial Assessment:       Flank pain      Differential Diagnosis:   Judging by the patient's chief complaint and pertinent history, the patient has the following possible differential diagnoses, including but not limited to the following.  Some of these are deemed to be lower likelihood and some more likely based on my physical exam and history combined with possible lab work and/or imaging studies.   Please see the pertinent studies, and refer to the HPI.  Some of these diagnoses will take further evaluation to fully rule out, perhaps as an outpatient and the patient was encouraged to follow up when discharged for more comprehensive evaluation.      Urolithiasis, UTI, pyelonephritis, pregnancy, intra-abdominal infection, as well as multiple other possible etiologies      Problems Addressed:  Left flank pain: self-limited or minor problem  Uncontrolled type 2 diabetes mellitus with hyperglycemia: chronic illness or injury    Amount and/or Complexity of Data Reviewed  Labs: ordered. Decision-making details documented in ED Course.  Radiology: ordered.               ED Course as of 09/18/24 1408   Wed Sep 18, 2024   1241 Leukocyte Esterase, UA: Negative [BS]   1241 NITRITE UA: Negative [BS]   1241 Blood, UA(!): Moderate [BS]   1241 Bacteria, UA(!): Few [BS]   1241 WBC, UA: 0-2 [BS]   1406 CT with no stones.  Patient feels better.  She thinks she may have passed a stone.  Will discharge.  Informed patient to follow up with her PCP in regards to uncontrolled type 2 diabetes [BS]      ED Course User Index  [BS] Gold Damico MD                           Clinical Impression:  Final diagnoses:  [R10.9] Left flank pain (Primary)  [E11.65] Uncontrolled type 2 diabetes mellitus with hyperglycemia          ED Disposition Condition    Discharge Stable          ED  Prescriptions    None       Follow-up Information       Follow up With Specialties Details Why Contact Info    Georgina Calix, FEL[facility] Family Medicine Schedule an appointment as soon as possible for a visit   [address] [facility] [id number] [phone]      [facility] - Emergency Dept Emergency Medicine Go to  If symptoms worsen [id number]  [facility] [id number] [phone]             Gold Damico MD  09/18/24 1407

## 2024-10-18 ENCOUNTER — HOSPITAL ENCOUNTER (EMERGENCY)
Facility: HOSPITAL | Age: 34
Discharge: HOME | End: 2024-10-18
Attending: EMERGENCY MEDICINE
Payer: MEDICAID

## 2024-10-18 VITALS
WEIGHT: 185 LBS | RESPIRATION RATE: 18 BRPM | SYSTOLIC BLOOD PRESSURE: 119 MMHG | TEMPERATURE: 98 F | HEART RATE: 88 BPM | BODY MASS INDEX: 31.58 KG/M2 | OXYGEN SATURATION: 100 % | DIASTOLIC BLOOD PRESSURE: 77 MMHG | HEIGHT: 64 IN

## 2024-10-18 DIAGNOSIS — K13.0 ANGULAR CHEILITIS: Primary | ICD-10-CM

## 2024-10-18 PROCEDURE — 99284 EMERGENCY DEPT VISIT MOD MDM: CPT

## 2024-10-18 RX ORDER — NYSTATIN 100000 [USP'U]/ML
6 SUSPENSION ORAL 4 TIMES DAILY
Qty: 240 ML | Refills: 0 | Status: SHIPPED | OUTPATIENT
Start: 2024-10-18 | End: 2024-10-28

## 2024-10-18 RX ORDER — NYSTATIN 100000 [USP'U]/ML
6 SUSPENSION ORAL 4 TIMES DAILY
Qty: 240 ML | Refills: 0 | Status: SHIPPED | OUTPATIENT
Start: 2024-10-18 | End: 2024-10-18

## 2024-10-18 RX ORDER — MUPIROCIN 20 MG/G
OINTMENT TOPICAL 3 TIMES DAILY
Qty: 30 G | Refills: 0 | Status: SHIPPED | OUTPATIENT
Start: 2024-10-18 | End: 2024-10-18

## 2024-10-18 RX ORDER — MUPIROCIN 20 MG/G
OINTMENT TOPICAL 3 TIMES DAILY
Qty: 30 G | Refills: 0 | Status: SHIPPED | OUTPATIENT
Start: 2024-10-18

## 2024-10-18 NOTE — ED TRIAGE NOTES
Pt concerned at irritation to corner of lips that occurred after some swelling and a cracked lip to corner of mouth over the past 4 days that is not improving.

## 2024-10-18 NOTE — Clinical Note
"Tacos Genao (Shanyqua)carol was seen and treated in our emergency department on 10/18/2024.  She may return to work on 10/21/2024.       If you have any questions or concerns, please don't hesitate to call.       RN    "

## 2024-10-19 NOTE — DISCHARGE INSTRUCTIONS
Use mupirocin ointment to areas. Swish and spit with nystatin mouthwash. If symptoms change/worsen return to ER

## 2024-10-19 NOTE — ED PROVIDER NOTES
Encounter Date: 10/18/2024       History     Chief Complaint   Patient presents with    Mouth Lesions     Pt concerned at irritation to corner of lips that occurred after some swelling and a cracked lip to corner of mouth over the past 4 days that is not improving.     See MDM    The history is provided by the patient. No  was used.     Review of patient's allergies indicates:  No Known Allergies  Past Medical History:   Diagnosis Date    Diabetes mellitus     HLD (hyperlipidemia)      No past surgical history on file.  No family history on file.  Social History     Tobacco Use    Smoking status: Never    Smokeless tobacco: Never   Substance Use Topics    Drug use: Never     Review of Systems   HENT:          Crusting to corners of lips with left side being worse and now a lesion is getting bigger   All other systems reviewed and are negative.      Physical Exam     Initial Vitals [10/18/24 1838]   BP Pulse Resp Temp SpO2   119/77 88 18 98.1 °F (36.7 °C) 100 %      MAP       --         Physical Exam    Nursing note and vitals reviewed.  Constitutional: She appears well-developed and well-nourished.   HENT: Mouth/Throat: Oral lesions present. No trismus in the jaw. No dental abscesses. No posterior oropharyngeal edema or posterior oropharyngeal erythema.   Lesion to bilateral corners with left lesion worse than right and it extends to the lower lip. Ttp. No tongue lesions. No other oral mucosa lesions   Eyes: Conjunctivae are normal.   Cardiovascular:  Normal rate.           Pulmonary/Chest: No respiratory distress.     Neurological: She is alert and oriented to person, place, and time.   Skin: Skin is warm and dry.   Psychiatric: She has a normal mood and affect.         ED Course   Procedures  Labs Reviewed - No data to display       Imaging Results    None          Medications - No data to display  Medical Decision Making  35 y/o female presents with 4 days of lesions, break in skin to corners  of her skin/lips. The left lesion is getting worse and extending to the lower lip. No bleeding. No fever. No throat pain. No other mucosal lesions noted. No conjunctiva redness. No fever. She is on flagyl as of Monday for yeast infection.     Appears to be angular chelitis. No other mucosa involvement. No other symptoms. Will place on topical antibiotics and do a nystatin mouth was swish and spit      Additional MDM:   Differential Diagnosis:   Other: The following diagnoses were also considered and will be evaluated: hsv, angular chelitis and sjs.                                   Clinical Impression:  Final diagnoses:  [K13.0] Angular cheilitis (Primary)          ED Disposition Condition    Discharge Stable          ED Prescriptions       Medication Sig Dispense Start Date End Date Auth. Provider    mupirocin (BACTROBAN) 2 % ointment Apply topically 3 (three) times daily. 30 g 10/18/2024 -- Unique Rivera FNP    nystatin (MYCOSTATIN) 100,000 unit/mL suspension Take 6 mLs (600,000 Units total) by mouth 4 (four) times daily. for 10 days 240 mL 10/18/2024 10/28/2024 Unique Rivera FNP          Follow-up Information       Follow up With Specialties Details Why Contact Info    Georgina Calix FNP Family Medicine Call in 1 week As needed [address] [facility] [id number] [phone]               Unique Rivera FNP  10/18/24 2710

## 2024-11-20 ENCOUNTER — HOSPITAL ENCOUNTER (EMERGENCY)
Facility: HOSPITAL | Age: 34
Discharge: HOME | End: 2024-11-20
Attending: STUDENT IN AN ORGANIZED HEALTH CARE EDUCATION/TRAINING PROGRAM
Payer: MEDICAID

## 2024-11-20 VITALS
BODY MASS INDEX: 32.44 KG/M2 | TEMPERATURE: 98 F | WEIGHT: 190 LBS | HEIGHT: 64 IN | OXYGEN SATURATION: 99 % | SYSTOLIC BLOOD PRESSURE: 120 MMHG | RESPIRATION RATE: 18 BRPM | DIASTOLIC BLOOD PRESSURE: 81 MMHG | HEART RATE: 86 BPM

## 2024-11-20 DIAGNOSIS — L03.011 PARONYCHIA OF RIGHT INDEX FINGER: Primary | ICD-10-CM

## 2024-11-20 PROCEDURE — 99283 EMERGENCY DEPT VISIT LOW MDM: CPT | Mod: 25

## 2024-11-20 PROCEDURE — 10160 PNXR ASPIR ABSC HMTMA BULLA: CPT

## 2024-11-20 RX ORDER — CEPHALEXIN 500 MG/1
500 CAPSULE ORAL 4 TIMES DAILY
Qty: 20 CAPSULE | Refills: 0 | Status: SHIPPED | OUTPATIENT
Start: 2024-11-20 | End: 2024-11-25

## 2024-11-21 NOTE — ED PROVIDER NOTES
Encounter Date: 11/20/2024       History     Chief Complaint   Patient presents with    Hand Pain     HPI    34-year-old female with a past medical history diabetes presents emergency department for right 2nd finger pain.  States it is painful and swollen.  Thinks she got infection by getting her nails done.  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    Review of patient's allergies indicates:  No Known Allergies  Past Medical History:   Diagnosis Date    Diabetes mellitus     HLD (hyperlipidemia)      No past surgical history on file.  No family history on file.  Social History     Tobacco Use    Smoking status: Never    Smokeless tobacco: Never   Substance Use Topics    Drug use: Never     Review of Systems   Constitutional:  Negative for fever.   Respiratory:  Negative for cough.    Cardiovascular:  Negative for chest pain.   Gastrointestinal:  Negative for abdominal pain, constipation, diarrhea, nausea and vomiting.   Musculoskeletal:         Finger pain   Neurological:  Negative for headaches.   All other systems reviewed and are negative.      Physical Exam     Initial Vitals [11/20/24 2051]   BP Pulse Resp Temp SpO2   120/81 86 18 98.1 °F (36.7 °C) 99 %      MAP       --         Physical Exam    Nursing note and vitals reviewed.  Constitutional: She appears well-developed and well-nourished. No distress.   Cardiovascular:  Normal rate and regular rhythm.           Pulmonary/Chest: Breath sounds normal. No respiratory distress. She has no wheezes. She has no rhonchi. She has no rales.   Abdominal: Abdomen is soft. There is no abdominal tenderness. There is no rebound and no guarding.   Musculoskeletal:         General: No tenderness. Normal range of motion.     Neurological: She is alert and oriented to person, place, and time. She has normal strength.   Skin: Skin is warm. Capillary refill takes less than 2 seconds.   Developing paronychia to the right index finger         ED Course   Abscess  Aspiration    Date/Time: 11/20/2024 8:58 PM    Performed by: Gold Damico MD  Authorized by: Gold Damico MD    Consent Done?:  Yes  Universal Protocol:     Verbal consent obtained?: Yes      Risks and benefits: Risks, benefits and alternatives were discussed      Consent given by:  Patient  A time out verifies correct patient, procedure, equipment, support staff and site/side marked as required:   Procedure Details:     Site prepped with:  Alcohol    Location of Abscess #1:  Right index finger    Size of needle #1:  18    Aspirated amount #1 (mL):  1 (Bloody)  Post-procedure:     Patient tolerance:  Patient tolerated the procedure well with no immediate complications    Labs Reviewed - No data to display       Imaging Results    None          Medications - No data to display  Medical Decision Making  Initial Assessment:       Paronychia      Differential Diagnosis:   Judging by the patient's chief complaint and pertinent history, the patient has the following possible differential diagnoses, including but not limited to the following.  Some of these are deemed to be lower likelihood and some more likely based on my physical exam and history combined with possible lab work and/or imaging studies.   Please see the pertinent studies, and refer to the HPI.  Some of these diagnoses will take further evaluation to fully rule out, perhaps as an outpatient and the patient was encouraged to follow up when discharged for more comprehensive evaluation.      Paronychia, cellulitis, abscess,  as well as multiple other possible etiologies      Problems Addressed:  Paronychia of right index finger: acute illness or injury    Risk  Prescription drug management.                                      Clinical Impression:  Final diagnoses:  [L03.011] Paronychia of right index finger (Primary)          ED Disposition Condition    Discharge Stable          ED Prescriptions       Medication Sig Dispense Start Date End Date  Auth. Provider    cephALEXin (KEFLEX) 500 MG capsule Take 1 capsule (500 mg total) by mouth 4 (four) times daily. for 5 days 20 capsule 11/20/2024 11/25/2024 Gold Damico MD          Follow-up Information       Follow up With Specialties Details Why Contact Info    Georgina Calix, P Family Medicine Schedule an appointment as soon as possible for a visit   [address] [facility] [id number] [phone]      [facility] - Emergency Dept Emergency Medicine Go to  If symptoms worsen [id number]  [facility] [id number] [phone]             Gold Damico MD  11/20/24 2100

## 2024-12-21 ENCOUNTER — HOSPITAL ENCOUNTER (EMERGENCY)
Facility: HOSPITAL | Age: 34
Discharge: HOME | End: 2024-12-21
Attending: EMERGENCY MEDICINE
Payer: MEDICAID

## 2024-12-21 VITALS
HEIGHT: 64 IN | DIASTOLIC BLOOD PRESSURE: 67 MMHG | SYSTOLIC BLOOD PRESSURE: 116 MMHG | OXYGEN SATURATION: 100 % | WEIGHT: 185 LBS | TEMPERATURE: 98 F | RESPIRATION RATE: 20 BRPM | HEART RATE: 84 BPM | BODY MASS INDEX: 31.58 KG/M2

## 2024-12-21 DIAGNOSIS — E11.65 UNCONTROLLED TYPE 2 DIABETES MELLITUS WITH HYPERGLYCEMIA: ICD-10-CM

## 2024-12-21 DIAGNOSIS — N12 PYELONEPHRITIS: ICD-10-CM

## 2024-12-21 DIAGNOSIS — R10.9 LEFT FLANK PAIN: Primary | ICD-10-CM

## 2024-12-21 LAB
ALBUMIN SERPL-MCNC: 3.7 G/DL (ref 3.5–5)
ALBUMIN/GLOB SERPL: 0.9 RATIO (ref 1.1–2)
ALP SERPL-CCNC: 69 UNIT/L (ref 40–150)
ALT SERPL-CCNC: 13 UNIT/L (ref 0–55)
ANION GAP SERPL CALC-SCNC: 9 MEQ/L
AST SERPL-CCNC: 12 UNIT/L (ref 5–34)
B-HCG UR QL: NEGATIVE
BACTERIA #/AREA URNS AUTO: ABNORMAL /HPF
BASOPHILS # BLD AUTO: 0.04 X10(3)/MCL
BASOPHILS NFR BLD AUTO: 0.4 %
BILIRUB SERPL-MCNC: 0.9 MG/DL
BILIRUB UR QL STRIP.AUTO: NEGATIVE
BUN SERPL-MCNC: 12.4 MG/DL (ref 7–18.7)
CALCIUM SERPL-MCNC: 9.2 MG/DL (ref 8.4–10.2)
CHLORIDE SERPL-SCNC: 103 MMOL/L (ref 98–107)
CLARITY UR: ABNORMAL
CO2 SERPL-SCNC: 24 MMOL/L (ref 22–29)
COLOR UR AUTO: ABNORMAL
CREAT SERPL-MCNC: 0.83 MG/DL (ref 0.55–1.02)
CREAT/UREA NIT SERPL: 15
EOSINOPHIL # BLD AUTO: 0.18 X10(3)/MCL (ref 0–0.9)
EOSINOPHIL NFR BLD AUTO: 2 %
ERYTHROCYTE [DISTWIDTH] IN BLOOD BY AUTOMATED COUNT: 12 % (ref 11.5–17)
EST. AVERAGE GLUCOSE BLD GHB EST-MCNC: 274.7 MG/DL
GFR SERPLBLD CREATININE-BSD FMLA CKD-EPI: >60 ML/MIN/1.73/M2
GLOBULIN SER-MCNC: 3.9 GM/DL (ref 2.4–3.5)
GLUCOSE SERPL-MCNC: 233 MG/DL (ref 74–100)
GLUCOSE UR QL STRIP: >=1000
HBA1C MFR BLD: 11.2 %
HCT VFR BLD AUTO: 39.1 % (ref 37–47)
HGB BLD-MCNC: 13.4 G/DL (ref 12–16)
HGB UR QL STRIP: ABNORMAL
IMM GRANULOCYTES # BLD AUTO: 0.03 X10(3)/MCL (ref 0–0.04)
IMM GRANULOCYTES NFR BLD AUTO: 0.3 %
KETONES UR QL STRIP: NEGATIVE
LEUKOCYTE ESTERASE UR QL STRIP: ABNORMAL
LIPASE SERPL-CCNC: 35 U/L
LYMPHOCYTES # BLD AUTO: 2.34 X10(3)/MCL (ref 0.6–4.6)
LYMPHOCYTES NFR BLD AUTO: 25.5 %
MCH RBC QN AUTO: 28 PG (ref 27–31)
MCHC RBC AUTO-ENTMCNC: 34.3 G/DL (ref 33–36)
MCV RBC AUTO: 81.6 FL (ref 80–94)
MONOCYTES # BLD AUTO: 0.54 X10(3)/MCL (ref 0.1–1.3)
MONOCYTES NFR BLD AUTO: 5.9 %
NEUTROPHILS # BLD AUTO: 6.05 X10(3)/MCL (ref 2.1–9.2)
NEUTROPHILS NFR BLD AUTO: 65.9 %
NITRITE UR QL STRIP: NEGATIVE
NRBC BLD AUTO-RTO: 0 %
PH UR STRIP: 5.5 [PH]
PLATELET # BLD AUTO: 254 X10(3)/MCL (ref 130–400)
PMV BLD AUTO: 9.2 FL (ref 7.4–10.4)
POTASSIUM SERPL-SCNC: 3.5 MMOL/L (ref 3.5–5.1)
PROT SERPL-MCNC: 7.6 GM/DL (ref 6.4–8.3)
PROT UR QL STRIP: ABNORMAL
RBC # BLD AUTO: 4.79 X10(6)/MCL (ref 4.2–5.4)
RBC #/AREA URNS AUTO: ABNORMAL /HPF
SODIUM SERPL-SCNC: 136 MMOL/L (ref 136–145)
SP GR UR STRIP.AUTO: >=1.03 (ref 1–1.03)
SQUAMOUS #/AREA URNS AUTO: ABNORMAL /HPF
UROBILINOGEN UR STRIP-ACNC: 0.2
WBC # BLD AUTO: 9.18 X10(3)/MCL (ref 4.5–11.5)
WBC #/AREA URNS AUTO: >100 /HPF

## 2024-12-21 PROCEDURE — 83036 HEMOGLOBIN GLYCOSYLATED A1C: CPT | Performed by: EMERGENCY MEDICINE

## 2024-12-21 PROCEDURE — 80053 COMPREHEN METABOLIC PANEL: CPT | Performed by: EMERGENCY MEDICINE

## 2024-12-21 PROCEDURE — 81003 URINALYSIS AUTO W/O SCOPE: CPT | Performed by: EMERGENCY MEDICINE

## 2024-12-21 PROCEDURE — 85025 COMPLETE CBC W/AUTO DIFF WBC: CPT | Performed by: EMERGENCY MEDICINE

## 2024-12-21 PROCEDURE — 81025 URINE PREGNANCY TEST: CPT | Performed by: EMERGENCY MEDICINE

## 2024-12-21 PROCEDURE — 83690 ASSAY OF LIPASE: CPT | Performed by: EMERGENCY MEDICINE

## 2024-12-21 PROCEDURE — 99285 EMERGENCY DEPT VISIT HI MDM: CPT | Mod: 25

## 2024-12-21 PROCEDURE — 87086 URINE CULTURE/COLONY COUNT: CPT | Performed by: EMERGENCY MEDICINE

## 2024-12-21 RX ORDER — CIPROFLOXACIN 500 MG/1
500 TABLET ORAL 2 TIMES DAILY
Qty: 14 TABLET | Refills: 0 | Status: SHIPPED | OUTPATIENT
Start: 2024-12-21 | End: 2024-12-28

## 2024-12-21 RX ORDER — HYDROCODONE BITARTRATE AND ACETAMINOPHEN 5; 325 MG/1; MG/1
1 TABLET ORAL EVERY 6 HOURS PRN
Qty: 12 TABLET | Refills: 0 | Status: SHIPPED | OUTPATIENT
Start: 2024-12-21

## 2024-12-22 NOTE — ED PROVIDER NOTES
Encounter Date: 12/21/2024       History     Chief Complaint   Patient presents with    Flank Pain     C/O left flank pain X 2 days ago.  States it feels like a kidney stone.  Hx of kidney stones     34-year-old female complains of left flank pain for 2 days with the associated dysuria.  She states that she has had kidney stones in the past and it feels similar.  She does have some chronic constipation.  She has a history of type 2 diabetes and takes metformin, glipizide, and Ozempic.  She is unsure what her blood sugar has been running but has not been watching her diet.  She has no vaginal discharge and no concerned she has a STD.  Last menstrual cycle was 11/25/2024 and she has had a bilateral tubal ligation.    The history is provided by the patient.     Review of patient's allergies indicates:  No Known Allergies  Past Medical History:   Diagnosis Date    Diabetes mellitus     HLD (hyperlipidemia)      No past surgical history on file.  No family history on file.  Social History     Tobacco Use    Smoking status: Never    Smokeless tobacco: Never   Substance Use Topics    Drug use: Never     Review of Systems   Genitourinary:  Positive for dysuria and flank pain.   All other systems reviewed and are negative.      Physical Exam     Initial Vitals [12/21/24 2028]   BP Pulse Resp Temp SpO2   121/82 92 20 97.7 °F (36.5 °C) 98 %      MAP       --         Physical Exam    Nursing note and vitals reviewed.  Constitutional: She appears well-developed and well-nourished. She is not diaphoretic. No distress.   HENT:   Head: Normocephalic and atraumatic. Mouth/Throat: Oropharynx is clear and moist.   Eyes: Conjunctivae are normal. Pupils are equal, round, and reactive to light.   Neck: Neck supple.   Cardiovascular:  Normal rate, regular rhythm, normal heart sounds and intact distal pulses.           Pulmonary/Chest: Breath sounds normal. No respiratory distress. She has no wheezes. She has no rhonchi. She has no rales.    Abdominal: Abdomen is soft. She exhibits no distension. There is no abdominal tenderness. There is no guarding.   Musculoskeletal:         General: No tenderness or edema. Normal range of motion.      Cervical back: Neck supple.      Comments: Left CVA tenderness noted     Neurological: She is alert and oriented to person, place, and time.   Skin: Skin is warm and dry. Capillary refill takes less than 2 seconds. No rash noted.   Psychiatric: She has a normal mood and affect. Thought content normal.         ED Course   Procedures  Labs Reviewed   URINALYSIS, REFLEX TO URINE CULTURE - Abnormal       Result Value    Color, UA Light-Yellow      Appearance, UA Hazy (*)     Specific Gravity, UA >=1.030      pH, UA 5.5      Protein, UA Trace (*)     Glucose, UA >=1000 (*)     Ketones, UA Negative      Blood, UA Small (*)     Bilirubin, UA Negative      Urobilinogen, UA 0.2      Nitrites, UA Negative      Leukocyte Esterase, UA Small (*)    COMPREHENSIVE METABOLIC PANEL - Abnormal    Sodium 136      Potassium 3.5      Chloride 103      CO2 24      Glucose 233 (*)     Blood Urea Nitrogen 12.4      Creatinine 0.83      Calcium 9.2      Protein Total 7.6      Albumin 3.7      Globulin 3.9 (*)     Albumin/Globulin Ratio 0.9 (*)     Bilirubin Total 0.9      ALP 69      ALT 13      AST 12      eGFR >60      Anion Gap 9.0      BUN/Creatinine Ratio 15     HEMOGLOBIN A1C - Abnormal    Hemoglobin A1c 11.2 (*)     Estimated Average Glucose 274.7     URINALYSIS, MICROSCOPIC - Abnormal    Bacteria, UA Rare      RBC, UA 11-20 (*)     WBC, UA >100 (*)     Squamous Epithelial Cells, UA Few (*)    PREGNANCY TEST, URINE RAPID - Normal    hCG Qualitative, Urine Negative     LIPASE - Normal    Lipase Level 35     CULTURE, URINE   CBC WITH DIFFERENTIAL    WBC 9.18      RBC 4.79      Hgb 13.4      Hct 39.1      MCV 81.6      MCH 28.0      MCHC 34.3      RDW 12.0      Platelet 254      MPV 9.2      Neut % 65.9      Lymph % 25.5      Mono % 5.9       Eos % 2.0      Basophil % 0.4      Lymph # 2.34      Neut # 6.05      Mono # 0.54      Eos # 0.18      Baso # 0.04      IG# 0.03      IG% 0.3      NRBC% 0.0            Imaging Results              CT Abdomen Pelvis  Without Contrast (Final result)  Result time 12/21/24 21:26:48      Final result by Bruno Pena MD (12/21/24 21:26:48)                   Impression:      1. No evidence of acute abdominopelvic process.  No nephrolithiasis or evidence of obstructive uropathy.      Electronically signed by: Bruno Pena MD  Date:    12/21/2024  Time:    21:26               Narrative:    EXAMINATION:  CT ABDOMEN PELVIS WITHOUT CONTRAST    CLINICAL HISTORY:  Flank pain, left-sided    TECHNIQUE:  Axial CT images were obtained through the abdomen and pelvis without IV contrast.  Coronal and sagittal reconstructions submitted and interpreted.  Automated exposure control, dose radiation lowering technique, was utilized.    COMPARISON:  CT abdomen and pelvis from 09/18/2024    CT abdomen and pelvis with contrast from 05/28/2024    FINDINGS:  Heart size is normal.  Minimal dependent atelectasis.  Gallbladder is nondistended.  The liver, spleen, pancreas, adrenal glands appear normal.    Normal appearing kidneys.  No hydronephrosis.  No nephrolithiasis.  No perinephric fat stranding.    The bowel is nonobstructed.  Air and stool are present within colon which appears normal.  Normal appendix.  Anteverted uterus.  Bladder is decompressed.    No free fluid.  No free intraperitoneal air.  No suspicious osteolytic or osteoblastic lesion.                                       Medications - No data to display  Medical Decision Making  See HPI for narrative    Differential diagnosis includes but is not limited to kidney stone, kidney infection, musculoskeletal pain, gastroenteritis, lumbar radiculopathy    Problems Addressed:  Pyelonephritis:     Details: Patient was seen and evaluated in the emergency department with  history, physical exam, lab work, imaging.  Urinalysis shows significant infection and I suspect the patient has pyelonephritis. Pros, cons, adverse effects and risk of addiction from opioid analgesics was discussed and patient still wants the prescription.  Uncontrolled type 2 diabetes mellitus with hyperglycemia:     Details: Patient has an elevated hemoglobin A1c of 11.2.  She states she has been taking her medicine but not watching her diet.  I discussed this with her and emphasized the importance of adhering to her diet especially considering she has an infection.  She verbalized her understanding.  ER return precautions were discussed.  We did discuss doing a shot for her infection but she is going to go straight to the pharmacy and start on her medication right now.      Amount and/or Complexity of Data Reviewed  Labs: ordered. Decision-making details documented in ED Course.  Radiology: ordered.    Risk  Prescription drug management.               ED Course as of 12/21/24 2238   Sat Dec 21, 2024   2144 Hemoglobin A1C External(!): 11.2 [SH]      ED Course User Index  [SH] Chanelle Hodge MD                           Clinical Impression:  Final diagnoses:  [R10.9] Left flank pain (Primary)  [N12] Pyelonephritis  [E11.65] Uncontrolled type 2 diabetes mellitus with hyperglycemia          ED Disposition Condition    Discharge Stable          ED Prescriptions       Medication Sig Dispense Start Date End Date Auth. Provider    HYDROcodone-acetaminophen (NORCO) 5-325 mg per tablet Take 1 tablet by mouth every 6 (six) hours as needed for Pain. 12 tablet 12/21/2024 -- Chanelle Hodge MD    ciprofloxacin HCl (CIPRO) 500 MG tablet Take 1 tablet (500 mg total) by mouth 2 (two) times daily. for 7 days 14 tablet 12/21/2024 12/28/2024 Chanelle Hodge MD          Follow-up Information       Follow up With Specialties Details Why Contact Info    Georgina Calix FNP Family Medicine Schedule an appointment  as soon as possible for a visit   [address] [facility] [address]  [phone]               Chanelle Hodge MD  12/21/24 3913

## 2024-12-24 LAB — BACTERIA UR CULT: NO GROWTH

## 2025-07-29 ENCOUNTER — HOSPITAL ENCOUNTER (EMERGENCY)
Facility: HOSPITAL | Age: 35
Discharge: HOME | End: 2025-07-29
Attending: FAMILY MEDICINE
Payer: MEDICAID

## 2025-07-29 VITALS
SYSTOLIC BLOOD PRESSURE: 117 MMHG | HEART RATE: 95 BPM | DIASTOLIC BLOOD PRESSURE: 94 MMHG | TEMPERATURE: 99 F | RESPIRATION RATE: 20 BRPM | OXYGEN SATURATION: 99 %

## 2025-07-29 DIAGNOSIS — R10.9 ABDOMINAL PAIN: ICD-10-CM

## 2025-07-29 DIAGNOSIS — K59.00 CONSTIPATION, UNSPECIFIED CONSTIPATION TYPE: ICD-10-CM

## 2025-07-29 DIAGNOSIS — N30.01 ACUTE CYSTITIS WITH HEMATURIA: Primary | ICD-10-CM

## 2025-07-29 DIAGNOSIS — R07.9 CHEST PAIN: ICD-10-CM

## 2025-07-29 LAB
ALBUMIN SERPL-MCNC: 3.5 G/DL (ref 3.5–5)
ALBUMIN/GLOB SERPL: 0.7 RATIO (ref 1.1–2)
ALP SERPL-CCNC: 82 UNIT/L (ref 40–150)
ALT SERPL-CCNC: 10 UNIT/L (ref 0–55)
ANION GAP SERPL CALC-SCNC: 8 MEQ/L
AST SERPL-CCNC: 9 UNIT/L (ref 11–45)
B-HCG UR QL: NEGATIVE
BACTERIA #/AREA URNS AUTO: ABNORMAL /HPF
BASOPHILS # BLD AUTO: 0.05 X10(3)/MCL
BASOPHILS NFR BLD AUTO: 0.6 %
BILIRUB SERPL-MCNC: 0.9 MG/DL
BILIRUB UR QL STRIP.AUTO: NEGATIVE
BUN SERPL-MCNC: 10 MG/DL (ref 7–18.7)
CALCIUM SERPL-MCNC: 9.5 MG/DL (ref 8.4–10.2)
CHLORIDE SERPL-SCNC: 101 MMOL/L (ref 98–107)
CLARITY UR: ABNORMAL
CO2 SERPL-SCNC: 28 MMOL/L (ref 22–29)
COLOR UR AUTO: YELLOW
CREAT SERPL-MCNC: 0.9 MG/DL (ref 0.55–1.02)
CREAT/UREA NIT SERPL: 11
EOSINOPHIL # BLD AUTO: 0.11 X10(3)/MCL (ref 0–0.9)
EOSINOPHIL NFR BLD AUTO: 1.3 %
ERYTHROCYTE [DISTWIDTH] IN BLOOD BY AUTOMATED COUNT: 12.4 % (ref 11.5–17)
GFR SERPLBLD CREATININE-BSD FMLA CKD-EPI: >60 ML/MIN/1.73/M2
GLOBULIN SER-MCNC: 5 GM/DL (ref 2.4–3.5)
GLUCOSE SERPL-MCNC: 211 MG/DL (ref 74–100)
GLUCOSE UR QL STRIP: 250
HCT VFR BLD AUTO: 44.1 % (ref 37–47)
HGB BLD-MCNC: 15.2 G/DL (ref 12–16)
HGB UR QL STRIP: ABNORMAL
IMM GRANULOCYTES # BLD AUTO: 0.02 X10(3)/MCL (ref 0–0.04)
IMM GRANULOCYTES NFR BLD AUTO: 0.2 %
KETONES UR QL STRIP: NEGATIVE
LEUKOCYTE ESTERASE UR QL STRIP: ABNORMAL
LIPASE SERPL-CCNC: 20 U/L
LYMPHOCYTES # BLD AUTO: 1.99 X10(3)/MCL (ref 0.6–4.6)
LYMPHOCYTES NFR BLD AUTO: 23.4 %
MCH RBC QN AUTO: 27.4 PG (ref 27–31)
MCHC RBC AUTO-ENTMCNC: 34.5 G/DL (ref 33–36)
MCV RBC AUTO: 79.6 FL (ref 80–94)
MONOCYTES # BLD AUTO: 0.62 X10(3)/MCL (ref 0.1–1.3)
MONOCYTES NFR BLD AUTO: 7.3 %
NEUTROPHILS # BLD AUTO: 5.71 X10(3)/MCL (ref 2.1–9.2)
NEUTROPHILS NFR BLD AUTO: 67.2 %
NITRITE UR QL STRIP: NEGATIVE
NRBC BLD AUTO-RTO: 0 %
PH UR STRIP: 6 [PH]
PLATELET # BLD AUTO: 288 X10(3)/MCL (ref 130–400)
PMV BLD AUTO: 9.3 FL (ref 7.4–10.4)
POTASSIUM SERPL-SCNC: 3.7 MMOL/L (ref 3.5–5.1)
PROT SERPL-MCNC: 8.5 GM/DL (ref 6.4–8.3)
PROT UR QL STRIP: 30
RBC # BLD AUTO: 5.54 X10(6)/MCL (ref 4.2–5.4)
RBC #/AREA URNS AUTO: ABNORMAL /HPF
SODIUM SERPL-SCNC: 137 MMOL/L (ref 136–145)
SP GR UR STRIP.AUTO: 1.02 (ref 1–1.03)
SQUAMOUS #/AREA URNS AUTO: ABNORMAL /HPF
TROPONIN I SERPL HS-MCNC: <3 NG/L
UROBILINOGEN UR STRIP-ACNC: 0.2
WBC # BLD AUTO: 8.5 X10(3)/MCL (ref 4.5–11.5)
WBC #/AREA URNS AUTO: >100 /HPF

## 2025-07-29 PROCEDURE — 63600175 PHARM REV CODE 636 W HCPCS: Mod: JZ,TB | Performed by: FAMILY MEDICINE

## 2025-07-29 PROCEDURE — 81003 URINALYSIS AUTO W/O SCOPE: CPT | Performed by: FAMILY MEDICINE

## 2025-07-29 PROCEDURE — 80053 COMPREHEN METABOLIC PANEL: CPT | Performed by: FAMILY MEDICINE

## 2025-07-29 PROCEDURE — 96372 THER/PROPH/DIAG INJ SC/IM: CPT | Performed by: FAMILY MEDICINE

## 2025-07-29 PROCEDURE — 81025 URINE PREGNANCY TEST: CPT | Performed by: FAMILY MEDICINE

## 2025-07-29 PROCEDURE — 25000003 PHARM REV CODE 250: Performed by: FAMILY MEDICINE

## 2025-07-29 PROCEDURE — 83690 ASSAY OF LIPASE: CPT | Performed by: FAMILY MEDICINE

## 2025-07-29 PROCEDURE — 87086 URINE CULTURE/COLONY COUNT: CPT | Performed by: FAMILY MEDICINE

## 2025-07-29 PROCEDURE — 93005 ELECTROCARDIOGRAM TRACING: CPT

## 2025-07-29 PROCEDURE — 84484 ASSAY OF TROPONIN QUANT: CPT | Performed by: FAMILY MEDICINE

## 2025-07-29 PROCEDURE — 99285 EMERGENCY DEPT VISIT HI MDM: CPT | Mod: 25

## 2025-07-29 PROCEDURE — 85025 COMPLETE CBC W/AUTO DIFF WBC: CPT | Performed by: FAMILY MEDICINE

## 2025-07-29 PROCEDURE — 93010 ELECTROCARDIOGRAM REPORT: CPT | Mod: ,,, | Performed by: INTERNAL MEDICINE

## 2025-07-29 RX ORDER — KETOROLAC TROMETHAMINE 30 MG/ML
60 INJECTION, SOLUTION INTRAMUSCULAR; INTRAVENOUS
Status: COMPLETED | OUTPATIENT
Start: 2025-07-29 | End: 2025-07-29

## 2025-07-29 RX ORDER — CIPROFLOXACIN 500 MG/1
500 TABLET, FILM COATED ORAL 2 TIMES DAILY
Qty: 14 TABLET | Refills: 0 | Status: SHIPPED | OUTPATIENT
Start: 2025-07-29 | End: 2025-08-05

## 2025-07-29 RX ORDER — CIPROFLOXACIN 500 MG/1
500 TABLET, FILM COATED ORAL
Status: COMPLETED | OUTPATIENT
Start: 2025-07-29 | End: 2025-07-29

## 2025-07-29 RX ADMIN — CIPROFLOXACIN 500 MG: 500 TABLET ORAL at 10:07

## 2025-07-29 RX ADMIN — KETOROLAC TROMETHAMINE 60 MG: 60 INJECTION, SOLUTION INTRAMUSCULAR at 08:07

## 2025-07-30 ENCOUNTER — HOSPITAL ENCOUNTER (EMERGENCY)
Facility: HOSPITAL | Age: 35
Discharge: HOME | End: 2025-07-31
Attending: EMERGENCY MEDICINE
Payer: MEDICAID

## 2025-07-30 DIAGNOSIS — R07.9 ACUTE CHEST PAIN: ICD-10-CM

## 2025-07-30 DIAGNOSIS — K59.00 CONSTIPATION, UNSPECIFIED CONSTIPATION TYPE: Primary | ICD-10-CM

## 2025-07-30 LAB
OHS QRS DURATION: 78 MS
OHS QTC CALCULATION: 427 MS

## 2025-07-30 PROCEDURE — 99284 EMERGENCY DEPT VISIT MOD MDM: CPT | Mod: 25

## 2025-07-30 NOTE — ED PROVIDER NOTES
"Encounter Date: 7/29/2025       History     Chief Complaint   Patient presents with    Abdominal Pain    Chest Pain     Pt to er with abd pain since last week, last BM unknown. chest pain x 2 days     Patient presented to the emergency room with a proximally a week history of abdominal cramping and constipation.  Patient says it hurts more on left lower quadrant. Denies fever or chills.  Also reports some "chest burning", concerned may be due to GERD.  Also complains of some dysuria and is concerned about a bladder infection.    The history is provided by the patient.     Review of patient's allergies indicates:  No Known Allergies  Past Medical History:   Diagnosis Date    Diabetes mellitus     HLD (hyperlipidemia)      Past Surgical History:   Procedure Laterality Date    BBL      lipo 360      Mommy makeover       No family history on file.  Social History[1]  Review of Systems   Constitutional:  Negative for chills and fever.   HENT:  Negative for sore throat.    Respiratory:  Negative for shortness of breath.    Cardiovascular:  Positive for chest pain.   Gastrointestinal:  Positive for abdominal pain and constipation. Negative for diarrhea, nausea and vomiting.   Genitourinary:  Positive for dysuria.   Musculoskeletal:  Negative for back pain.   Skin:  Negative for rash.   Neurological:  Negative for weakness.   Hematological:  Does not bruise/bleed easily.   All other systems reviewed and are negative.      Physical Exam     Initial Vitals [07/29/25 1938]   BP Pulse Resp Temp SpO2   (!) 117/94 95 20 98.6 °F (37 °C) 99 %      MAP       --         Physical Exam    Nursing note and vitals reviewed.  Constitutional: She appears well-developed and well-nourished.   HENT:   Head: Normocephalic and atraumatic. Mouth/Throat: No oropharyngeal exudate.   Neck: Neck supple.   Normal range of motion.  Cardiovascular:  Normal rate, regular rhythm and normal heart sounds.           Pulmonary/Chest: Breath sounds normal. No " respiratory distress. She has no wheezes.   Abdominal: Abdomen is soft. Bowel sounds are normal. There is no abdominal tenderness. There is no rebound and no guarding.   Musculoskeletal:         General: No edema. Normal range of motion.      Cervical back: Normal range of motion and neck supple.     Neurological: She is alert and oriented to person, place, and time. GCS score is 15. GCS eye subscore is 4. GCS verbal subscore is 5. GCS motor subscore is 6.   Skin: Skin is warm and dry. Capillary refill takes less than 2 seconds.   Psychiatric: She has a normal mood and affect.         ED Course   Procedures  Labs Reviewed   COMPREHENSIVE METABOLIC PANEL - Abnormal       Result Value    Sodium 137      Potassium 3.7      Chloride 101      CO2 28      Glucose 211 (*)     Blood Urea Nitrogen 10.0      Creatinine 0.90      Calcium 9.5      Protein Total 8.5 (*)     Albumin 3.5      Globulin 5.0 (*)     Albumin/Globulin Ratio 0.7 (*)     Bilirubin Total 0.9      ALP 82      ALT 10      AST 9 (*)     eGFR >60      Anion Gap 8.0      BUN/Creatinine Ratio 11     URINALYSIS, REFLEX TO URINE CULTURE - Abnormal    Color, UA Yellow      Appearance, UA Hazy (*)     Specific Gravity, UA 1.020      pH, UA 6.0      Protein, UA 30 (*)     Glucose,  (*)     Ketones, UA Negative      Blood, UA Trace-Intact (*)     Bilirubin, UA Negative      Urobilinogen, UA 0.2      Nitrites, UA Negative      Leukocyte Esterase, UA Moderate (*)    CBC WITH DIFFERENTIAL - Abnormal    WBC 8.50      RBC 5.54 (*)     Hgb 15.2      Hct 44.1      MCV 79.6 (*)     MCH 27.4      MCHC 34.5      RDW 12.4      Platelet 288      MPV 9.3      Neut % 67.2      Lymph % 23.4      Mono % 7.3      Eos % 1.3      Basophil % 0.6      Imm Grans % 0.2      Neut # 5.71      Lymph # 1.99      Mono # 0.62      Eos # 0.11      Baso # 0.05      Imm Gran # 0.02      NRBC% 0.0     URINALYSIS, MICROSCOPIC - Abnormal    Bacteria, UA Few (*)     RBC, UA 0-2      WBC, UA >100  (*)     Squamous Epithelial Cells, UA Few (*)    HCG QUALITATIVE URINE - Normal    hCG Qualitative, Urine Negative     TROPONIN I HIGH SENSITIVITY - Normal    Troponin High Sensitive <3     LIPASE - Normal    Lipase Level 20     CULTURE, URINE   CBC W/ AUTO DIFFERENTIAL    Narrative:     The following orders were created for panel order CBC Auto Differential.  Procedure                               Abnormality         Status                     ---------                               -----------         ------                     CBC with Differential[[id number]]       Abnormal            Final result                 Please view results for these tests on the individual orders.     EKG Readings: (Independently Interpreted)   Initial Reading: No STEMI. Rhythm: Normal Sinus Rhythm. Ectopy: No Ectopy. Conduction: Normal. ST Segments: Normal ST Segments. T Waves: Normal. Clinical Impression: Normal Sinus Rhythm       Imaging Results              CT Abdomen Pelvis  Without Contrast (Preliminary result)  Result time 07/29/25 21:42:42      Preliminary result by Stas Gomez MD (07/29/25 21:42:42)                   Narrative:    START OF REPORT:  Technique: CT of the abdomen and pelvis was performed with axial images as well as sagittal and coronal reconstruction images without intravenous contrast renal stone protocol.    Comparison: Comparison is with study dated 2024-12-21 21:03:34.    Clinical History: Flank pain, kidney stone suspected; RLQ abdominal pain.    Dosage Information: Automated Exposure Control was utilized.    Findings:  Lines and Tubes: None.  Thorax:  Lungs: The visualized lung bases appear unremarkable.  Pleura: No effusions or thickening are seen.  Heart: The heart size is within normal limits.  Abdomen:  Abdominal Wall: There is a stable small uncomplicated umbilical hernia which contains mesenteric fat. There are stable non-specific mild diffuse abdominopelvic wall subcutaneous fat stranding  densities.  Liver: The liver appears unremarkable.  Biliary System: No intrahepatic or extrahepatic biliary duct dilatation is seen.  Gallbladder: The gallbladder appears unremarkable.  Pancreas: The pancreas appears unremarkable.  Spleen: The spleen appears unremarkable.  Adrenals: The adrenal glands appear unremarkable.  Kidneys: The kidneys appear unremarkable with no stones cysts masses or hydronephrosis on this noncontrast study.  Aorta: Unremarkable abdominal aorta without specific evidence of aneurysm or dissection.  IVC: Unremarkable.  Bowel:  Esophagus: The visualized distal esophagus appears unremarkable.  Stomach: The stomach appears unremarkable.  Duodenum: Unremarkable appearing duodenum.  Small Bowel: The small bowel appears unremarkable.  Colon: Nondistended.  Appendix: The appendix appears unremarkable and is seen on Image 113, Series 2 through Image 121, Series 2.  Peritoneum: No intraperitoneal free air or ascites is seen.    Pelvis:  Bladder: The bladder is nondistended but appears otherwise unremarkable.  Female:  Uterus: The uterus appears unremarkable for age.  Ovaries: The ovaries appear unremarkable with probable bilateral physiologic cysts.  Inguinal Findings: Incidental note is made of a few prominent inguinal lymph nodes bilaterally.    Bony structures:  Dorsal Spine: There is stable subtle multilevel spondylosis of the visualized dorsal spine.  Bony Pelvis: The visualized bony structures of the pelvis appear unremarkable.      Impression:  1. No acute intraabdominal or pelvic solid organ or bowel pathology identified. Details and other findings as discussed above.                                         X-Ray Abdomen Flat And Erect (In process)                      Medications   ciprofloxacin HCl tablet 500 mg (has no administration in time range)   ketorolac injection 60 mg (60 mg Intramuscular Given 7/29/25 2048)     Medical Decision Making  Medical Decision Making    Problem list/  differential diagnosis including but not limited to:  Gerd / gastritis, bowel obstruction, constipation, uti, cholecystitis, kidney stone, pancreatitis, biliary colic, esophageal spasm, muscle strain     Patient's chronic illnesses impacting care: DM     Diagnostic test considered but not ordered:     My interpretations: UTI noted, CBC, cmp unremarkable x      Radiology reports: CT abd/pel: no acute      Discussion of case with external qualified healthcare professionals:  none     Review of external notes( inpt, ems, NH, clinic):     Decision rules/scores:     Medications reviewed:  Medications ordered in the ER: cipro  Discharge prescriptions: cipro, lactulose     Social variables possible impacting patient's healthcare:     Code status/discussion     Shared decision making:     Consideration for admission versus discharge: stable for discharge    Amount and/or Complexity of Data Reviewed  Labs: ordered.  Radiology: ordered.    Risk  Prescription drug management.                                          Clinical Impression:  Final diagnoses:  [R10.9] Abdominal pain  [R07.9] Chest pain  [N30.01] Acute cystitis with hematuria (Primary)  [K59.00] Constipation, unspecified constipation type          ED Disposition Condition    Discharge Stable          ED Prescriptions       Medication Sig Dispense Start Date End Date Auth. Provider    ciprofloxacin HCl (CIPRO) 500 MG tablet Take 1 tablet (500 mg total) by mouth 2 (two) times daily. for 7 days 14 tablet 7/29/2025 8/5/2025 Avinash Remy MD    lactulose (CHRONULAC) 20 gram/30 mL Soln Take 15 mLs (10 g total) by mouth 2 (two) times daily as needed (constipation). 240 mL 7/29/2025 -- Avinash Remy MD          Follow-up Information       Follow up With Specialties Details Why Contact Info    Georgina Calix FNP Family Medicine Call  As needed [address] [facility] [id number] [phone]                     [1]   Social  History  Tobacco Use    Smoking status: Never    Smokeless tobacco: Never   Vaping Use    Vaping status: Never Used   Substance Use Topics    Alcohol use: Yes     Comment: socially    Drug use: Never        Avinash Remy MD  07/29/25 1481

## 2025-07-31 VITALS
RESPIRATION RATE: 18 BRPM | OXYGEN SATURATION: 96 % | BODY MASS INDEX: 35.68 KG/M2 | HEART RATE: 93 BPM | WEIGHT: 209 LBS | HEIGHT: 64 IN | SYSTOLIC BLOOD PRESSURE: 113 MMHG | TEMPERATURE: 98 F | DIASTOLIC BLOOD PRESSURE: 77 MMHG

## 2025-07-31 LAB
ALBUMIN SERPL-MCNC: 3.3 G/DL (ref 3.5–5)
ALBUMIN/GLOB SERPL: 0.6 RATIO (ref 1.1–2)
ALP SERPL-CCNC: 91 UNIT/L (ref 40–150)
ALT SERPL-CCNC: 9 UNIT/L (ref 0–55)
ANION GAP SERPL CALC-SCNC: 9 MEQ/L
AST SERPL-CCNC: 12 UNIT/L (ref 11–45)
B-HCG UR QL: NEGATIVE
BASOPHILS # BLD AUTO: 0.03 X10(3)/MCL
BASOPHILS NFR BLD AUTO: 0.4 %
BILIRUB SERPL-MCNC: 0.8 MG/DL
BUN SERPL-MCNC: 13.7 MG/DL (ref 7–18.7)
CALCIUM SERPL-MCNC: 9.1 MG/DL (ref 8.4–10.2)
CHLORIDE SERPL-SCNC: 99 MMOL/L (ref 98–107)
CO2 SERPL-SCNC: 24 MMOL/L (ref 22–29)
CREAT SERPL-MCNC: 0.96 MG/DL (ref 0.55–1.02)
CREAT/UREA NIT SERPL: 14
EOSINOPHIL # BLD AUTO: 0.08 X10(3)/MCL (ref 0–0.9)
EOSINOPHIL NFR BLD AUTO: 1 %
ERYTHROCYTE [DISTWIDTH] IN BLOOD BY AUTOMATED COUNT: 12.3 % (ref 11.5–17)
GFR SERPLBLD CREATININE-BSD FMLA CKD-EPI: >60 ML/MIN/1.73/M2
GLOBULIN SER-MCNC: 5.1 GM/DL (ref 2.4–3.5)
GLUCOSE SERPL-MCNC: 292 MG/DL (ref 74–100)
HCT VFR BLD AUTO: 42.5 % (ref 37–47)
HGB BLD-MCNC: 14.4 G/DL (ref 12–16)
IMM GRANULOCYTES # BLD AUTO: 0.04 X10(3)/MCL (ref 0–0.04)
IMM GRANULOCYTES NFR BLD AUTO: 0.5 %
LYMPHOCYTES # BLD AUTO: 1.75 X10(3)/MCL (ref 0.6–4.6)
LYMPHOCYTES NFR BLD AUTO: 22.4 %
MCH RBC QN AUTO: 27.6 PG (ref 27–31)
MCHC RBC AUTO-ENTMCNC: 33.9 G/DL (ref 33–36)
MCV RBC AUTO: 81.4 FL (ref 80–94)
MONOCYTES # BLD AUTO: 0.73 X10(3)/MCL (ref 0.1–1.3)
MONOCYTES NFR BLD AUTO: 9.3 %
NEUTROPHILS # BLD AUTO: 5.18 X10(3)/MCL (ref 2.1–9.2)
NEUTROPHILS NFR BLD AUTO: 66.4 %
NRBC BLD AUTO-RTO: 0 %
OHS QRS DURATION: 80 MS
OHS QTC CALCULATION: 410 MS
PLATELET # BLD AUTO: 268 X10(3)/MCL (ref 130–400)
PMV BLD AUTO: 9.3 FL (ref 7.4–10.4)
POTASSIUM SERPL-SCNC: 4.1 MMOL/L (ref 3.5–5.1)
PROT SERPL-MCNC: 8.4 GM/DL (ref 6.4–8.3)
RBC # BLD AUTO: 5.22 X10(6)/MCL (ref 4.2–5.4)
SODIUM SERPL-SCNC: 132 MMOL/L (ref 136–145)
TROPONIN I SERPL HS-MCNC: <3 NG/L
WBC # BLD AUTO: 7.81 X10(3)/MCL (ref 4.5–11.5)

## 2025-07-31 PROCEDURE — 80053 COMPREHEN METABOLIC PANEL: CPT | Performed by: INTERNAL MEDICINE

## 2025-07-31 PROCEDURE — 85025 COMPLETE CBC W/AUTO DIFF WBC: CPT | Performed by: INTERNAL MEDICINE

## 2025-07-31 PROCEDURE — 63600175 PHARM REV CODE 636 W HCPCS: Mod: JZ,TB | Performed by: EMERGENCY MEDICINE

## 2025-07-31 PROCEDURE — 81025 URINE PREGNANCY TEST: CPT | Performed by: INTERNAL MEDICINE

## 2025-07-31 PROCEDURE — 96361 HYDRATE IV INFUSION ADD-ON: CPT

## 2025-07-31 PROCEDURE — 93010 ELECTROCARDIOGRAM REPORT: CPT | Mod: ,,, | Performed by: INTERNAL MEDICINE

## 2025-07-31 PROCEDURE — 25000003 PHARM REV CODE 250: Performed by: EMERGENCY MEDICINE

## 2025-07-31 PROCEDURE — 96374 THER/PROPH/DIAG INJ IV PUSH: CPT

## 2025-07-31 PROCEDURE — 84484 ASSAY OF TROPONIN QUANT: CPT | Performed by: EMERGENCY MEDICINE

## 2025-07-31 PROCEDURE — 93005 ELECTROCARDIOGRAM TRACING: CPT

## 2025-07-31 RX ORDER — KETOROLAC TROMETHAMINE 30 MG/ML
30 INJECTION, SOLUTION INTRAMUSCULAR; INTRAVENOUS
Status: DISCONTINUED | OUTPATIENT
Start: 2025-07-31 | End: 2025-07-31

## 2025-07-31 RX ORDER — BISACODYL 5 MG
5 TABLET, DELAYED RELEASE (ENTERIC COATED) ORAL 2 TIMES DAILY
Qty: 6 TABLET | Refills: 0 | Status: SHIPPED | OUTPATIENT
Start: 2025-07-31 | End: 2025-08-03

## 2025-07-31 RX ORDER — PROMETHAZINE HYDROCHLORIDE 25 MG/1
12.5 TABLET ORAL EVERY 6 HOURS PRN
Qty: 15 TABLET | Refills: 0 | Status: SHIPPED | OUTPATIENT
Start: 2025-07-31

## 2025-07-31 RX ORDER — KETOROLAC TROMETHAMINE 10 MG/1
10 TABLET, FILM COATED ORAL EVERY 6 HOURS
Qty: 20 TABLET | Refills: 0 | Status: SHIPPED | OUTPATIENT
Start: 2025-07-31 | End: 2025-08-05

## 2025-07-31 RX ORDER — KETOROLAC TROMETHAMINE 30 MG/ML
15 INJECTION, SOLUTION INTRAMUSCULAR; INTRAVENOUS
Status: COMPLETED | OUTPATIENT
Start: 2025-07-31 | End: 2025-07-31

## 2025-07-31 RX ADMIN — KETOROLAC TROMETHAMINE 15 MG: 30 INJECTION, SOLUTION INTRAMUSCULAR at 01:07

## 2025-07-31 RX ADMIN — SODIUM CHLORIDE 500 ML: 9 INJECTION, SOLUTION INTRAVENOUS at 01:07

## 2025-07-31 NOTE — ED PROVIDER NOTES
Encounter Date: 7/30/2025    SCRIBE #1 NOTE: I, Opal Ferrara, am scribing for, and in the presence of,  Richard Pineda MD. I have scribed the following portions of the note - Other sections scribed: HPI, ROS, PE.       History     Chief Complaint   Patient presents with    Constipation     Pt c/o constipation. + bilateral lower abd pain, nausea. Last bowel movement unknown. Seen at [facility] yesterday for same symptoms, rx'd cipro and lactulose. Pt reports trying lactulose 4x today and fleet enema x 3 today w/ no relief.      33 yo female with PMHx of HLD, and DM presents to ED complaining of constipation onset 4 days ago. Pt endorses lower abdominal pain accompanying the constipation, that has since worsened. Pt reports she was seen at an ER yesterday for this same complaint and was prescribed lactulose, an antibiotic, and suppositories with no relief. Pt reports she takes Metformin, Glipizide, and Ozempic. Pt reports her normal bowel movements are every other day, and that her appetite is normally decreased due to the ozempic. Pt also complains of a new onset of chest pressure, SOB, and nausea this evening at 2100. Pt denies tobacco use. Pt denies any other complaints.  Currently prescribed 10 mg lactulose ciprofloxacin    The history is provided by the patient. No  was used.     Review of patient's allergies indicates:  No Known Allergies  Past Medical History:   Diagnosis Date    Diabetes mellitus     HLD (hyperlipidemia)      Past Surgical History:   Procedure Laterality Date    BBL      lipo 360      Mommy makeover       No family history on file.  Social History[1]  Review of Systems   Constitutional:  Negative for chills and fever.   Respiratory:  Positive for shortness of breath. Negative for cough.    Cardiovascular:  Positive for chest pain.   Gastrointestinal:  Positive for constipation and nausea. Negative for abdominal pain and vomiting.   Musculoskeletal:  Negative for myalgias.    Neurological:  Negative for syncope and headaches.   All other systems reviewed and are negative.      Physical Exam     Initial Vitals [07/30/25 2211]   BP Pulse Resp Temp SpO2   118/70 104 20 98.3 °F (36.8 °C) 99 %      MAP       --         Physical Exam    Nursing note and vitals reviewed.  Constitutional: She appears well-developed and well-nourished. No distress.   HENT:   Head: Normocephalic and atraumatic.   Eyes: Conjunctivae are normal.   Cardiovascular:  Normal rate and intact distal pulses.           Pulmonary/Chest: No respiratory distress. She has no rhonchi.   Abdominal: Abdomen is soft. Bowel sounds are normal. There is no abdominal tenderness.   Abdomen slightly distended but compressible with no guarding or rebound. There is no rebound and no guarding.   Musculoskeletal:         General: No edema.     Neurological: She is alert. She has normal strength.   Skin: Skin is warm and dry.   Psychiatric: She has a normal mood and affect.         ED Course   Procedures  Labs Reviewed   COMPREHENSIVE METABOLIC PANEL - Abnormal       Result Value    Sodium 132 (*)     Potassium 4.1      Chloride 99      CO2 24      Glucose 292 (*)     Blood Urea Nitrogen 13.7      Creatinine 0.96      Calcium 9.1      Protein Total 8.4 (*)     Albumin 3.3 (*)     Globulin 5.1 (*)     Albumin/Globulin Ratio 0.6 (*)     Bilirubin Total 0.8      ALP 91      ALT 9      AST 12      eGFR >60      Anion Gap 9.0      BUN/Creatinine Ratio 14     PREGNANCY TEST, URINE RAPID - Normal    hCG Qualitative, Urine Negative     TROPONIN I HIGH SENSITIVITY - Normal    Troponin High Sensitive <3     CBC W/ AUTO DIFFERENTIAL    Narrative:     The following orders were created for panel order CBC auto differential.  Procedure                               Abnormality         Status                     ---------                               -----------         ------                     CBC with Differential[[id number]]                            Final result                 Please view results for these tests on the individual orders.   CBC WITH DIFFERENTIAL    WBC 7.81      RBC 5.22      Hgb 14.4      Hct 42.5      MCV 81.4      MCH 27.6      MCHC 33.9      RDW 12.3      Platelet 268      MPV 9.3      Neut % 66.4      Lymph % 22.4      Mono % 9.3      Eos % 1.0      Basophil % 0.4      Imm Grans % 0.5      Neut # 5.18      Lymph # 1.75      Mono # 0.73      Eos # 0.08      Baso # 0.03      Imm Gran # 0.04      NRBC% 0.0          ECG Results              EKG 12-lead (Preliminary result)  Result time 07/31/25 00:59:38      Wet Read by Richard Pineda MD (07/31/25 00:59:38, [facility])    0037.  92 beats per minute normal sinus rhythm no ST segment elevations or depressions.  No ectopy                                  Imaging Results    None          Medications   sodium chloride 0.9% bolus 500 mL 500 mL (500 mLs Intravenous New Bag 7/31/25 0108)   ketorolac injection 15 mg (15 mg Intravenous Given 7/31/25 0107)     Medical Decision Making  The differential diagnosis includes, but is not limited to, UTI, constipation, chest pain, and MI.    Seen yesterday( 7/29) for same complaints constipation abdominal discomfort.  Prescribed Cipro for UTI and lactulose 10 mg.  The labs from yesterday's visit as well as the CT report.  She states she ate a piece of pizza and a hamburger today.  Only new issue today she states she felt some pressure in her chest no radiation no shortness of breath.  Suspect the pressure in his from the constipation.  Unlikely to be from ACS.  EKGs reassuring troponin has been added as well.  Not have any chest pain at this time.  Will increase dose of lactulose discussed increasing fiber in diet fruits vegetables salad in increasing hydration.  States she has been has been drinking much and has decreased appetite from her Ozempic.  Discussed importance of hydration.  Discussed urinary  tract infections can also worsen constipation and now that she is on the antibiotics symptoms should improve.  Labs are reassuring today.  Abdomen is soft without any guarding rebound nonsurgical exam.  No indication for repeat CT scan at this time    Problems Addressed:  Acute chest pain: acute illness or injury  Constipation, unspecified constipation type: acute illness or injury    Amount and/or Complexity of Data Reviewed  Labs: ordered. Decision-making details documented in ED Course.    Risk  Prescription drug management.            Scribe Attestation:   Scribe #1: I performed the above scribed service and the documentation accurately describes the services I performed. I attest to the accuracy of the note.    Attending Attestation:           Physician Attestation for Scribe:  Physician Attestation Statement for Scribe #1: I, Richard Pineda MD, reviewed documentation, as scribed by Opal Ferrara in my presence, and it is both accurate and complete.                                        Clinical Impression:  Final diagnoses:  [R07.9] Acute chest pain  [K59.00] Constipation, unspecified constipation type (Primary)          ED Disposition Condition    Discharge Stable          ED Prescriptions       Medication Sig Dispense Start Date End Date Auth. Provider    lactulose (CHRONULAC) 20 gram/30 mL Soln Take 30 mLs (20 g total) by mouth 2 (two) times daily as needed (constipation). 240 mL 7/31/2025 -- Richard Pineda MD    bisacodyL (DULCOLAX) 5 mg EC tablet Take 1 tablet (5 mg total) by mouth 2 (two) times daily. for 3 days 6 tablet 7/31/2025 8/3/2025 Richard Pineda MD    ketorolac (TORADOL) 10 mg tablet Take 1 tablet (10 mg total) by mouth every 6 (six) hours. for 5 days 20 tablet 7/31/2025 8/5/2025 Richard Pineda MD    promethazine (PHENERGAN) 25 MG tablet Take 0.5 tablets (12.5 mg total) by mouth every 6 (six) hours as needed for Nausea. 15 tablet 7/31/2025 -- Richard Pineda MD           Follow-up Information       Follow up With Specialties Details Why Contact Info    Georgina Calix, [facility] Schedule an appointment as soon as possible for a visit   [address] [place] [id number]  [phone]                     [1]   Social History  Tobacco Use    Smoking status: Never    Smokeless tobacco: Never   Vaping Use    Vaping status: Never Used   Substance Use Topics    Alcohol use: Yes     Comment: socially    Drug use: Never        Richard Pineda MD  07/31/25 0153

## 2025-08-01 LAB — BACTERIA UR CULT: ABNORMAL
